# Patient Record
Sex: MALE | Race: WHITE | NOT HISPANIC OR LATINO
[De-identification: names, ages, dates, MRNs, and addresses within clinical notes are randomized per-mention and may not be internally consistent; named-entity substitution may affect disease eponyms.]

---

## 2017-02-02 ENCOUNTER — APPOINTMENT (OUTPATIENT)
Dept: GASTROENTEROLOGY | Facility: CLINIC | Age: 62
End: 2017-02-02

## 2017-03-01 ENCOUNTER — APPOINTMENT (OUTPATIENT)
Dept: NUCLEAR MEDICINE | Facility: HOSPITAL | Age: 62
End: 2017-03-01

## 2017-03-01 ENCOUNTER — OUTPATIENT (OUTPATIENT)
Dept: OUTPATIENT SERVICES | Facility: HOSPITAL | Age: 62
LOS: 1 days | Discharge: HOME | End: 2017-03-01
Payer: COMMERCIAL

## 2017-03-01 DIAGNOSIS — Z90.49 ACQUIRED ABSENCE OF OTHER SPECIFIED PARTS OF DIGESTIVE TRACT: Chronic | ICD-10-CM

## 2017-03-01 DIAGNOSIS — K29.70 GASTRITIS, UNSPECIFIED, WITHOUT BLEEDING: ICD-10-CM

## 2017-03-01 PROCEDURE — 78264 GASTRIC EMPTYING IMG STUDY: CPT | Mod: 26

## 2017-03-07 ENCOUNTER — TRANSCRIPTION ENCOUNTER (OUTPATIENT)
Age: 62
End: 2017-03-07

## 2017-03-13 ENCOUNTER — APPOINTMENT (OUTPATIENT)
Dept: GASTROENTEROLOGY | Facility: CLINIC | Age: 62
End: 2017-03-13

## 2017-04-03 ENCOUNTER — RX RENEWAL (OUTPATIENT)
Age: 62
End: 2017-04-03

## 2017-04-11 ENCOUNTER — APPOINTMENT (OUTPATIENT)
Dept: GASTROENTEROLOGY | Facility: CLINIC | Age: 62
End: 2017-04-11

## 2017-05-04 ENCOUNTER — APPOINTMENT (OUTPATIENT)
Dept: RADIOLOGY | Facility: CLINIC | Age: 62
End: 2017-05-04

## 2017-05-04 ENCOUNTER — OUTPATIENT (OUTPATIENT)
Dept: OUTPATIENT SERVICES | Facility: HOSPITAL | Age: 62
LOS: 1 days | End: 2017-05-04
Payer: COMMERCIAL

## 2017-05-04 DIAGNOSIS — Z90.49 ACQUIRED ABSENCE OF OTHER SPECIFIED PARTS OF DIGESTIVE TRACT: Chronic | ICD-10-CM

## 2017-05-04 DIAGNOSIS — Z00.8 ENCOUNTER FOR OTHER GENERAL EXAMINATION: ICD-10-CM

## 2017-05-04 PROCEDURE — 72070 X-RAY EXAM THORAC SPINE 2VWS: CPT

## 2017-05-04 PROCEDURE — 72202 X-RAY EXAM SI JOINTS 3/> VWS: CPT

## 2017-05-04 PROCEDURE — 72110 X-RAY EXAM L-2 SPINE 4/>VWS: CPT

## 2017-06-20 ENCOUNTER — APPOINTMENT (OUTPATIENT)
Dept: GASTROENTEROLOGY | Facility: CLINIC | Age: 62
End: 2017-06-20

## 2017-08-30 ENCOUNTER — INPATIENT (INPATIENT)
Facility: HOSPITAL | Age: 62
LOS: 1 days | Discharge: HOME | DRG: 93 | End: 2017-09-01
Attending: FAMILY MEDICINE | Admitting: HOSPITALIST
Payer: COMMERCIAL

## 2017-08-30 VITALS
SYSTOLIC BLOOD PRESSURE: 166 MMHG | HEART RATE: 103 BPM | HEIGHT: 71 IN | WEIGHT: 174.17 LBS | TEMPERATURE: 98 F | OXYGEN SATURATION: 99 % | RESPIRATION RATE: 19 BRPM | DIASTOLIC BLOOD PRESSURE: 69 MMHG

## 2017-08-30 DIAGNOSIS — R07.9 CHEST PAIN, UNSPECIFIED: ICD-10-CM

## 2017-08-30 DIAGNOSIS — Z90.49 ACQUIRED ABSENCE OF OTHER SPECIFIED PARTS OF DIGESTIVE TRACT: Chronic | ICD-10-CM

## 2017-08-30 DIAGNOSIS — E78.00 PURE HYPERCHOLESTEROLEMIA, UNSPECIFIED: ICD-10-CM

## 2017-08-30 DIAGNOSIS — K31.84 GASTROPARESIS: ICD-10-CM

## 2017-08-30 DIAGNOSIS — Z29.9 ENCOUNTER FOR PROPHYLACTIC MEASURES, UNSPECIFIED: ICD-10-CM

## 2017-08-30 DIAGNOSIS — E11.9 TYPE 2 DIABETES MELLITUS WITHOUT COMPLICATIONS: ICD-10-CM

## 2017-08-30 DIAGNOSIS — N40.0 BENIGN PROSTATIC HYPERPLASIA WITHOUT LOWER URINARY TRACT SYMPTOMS: ICD-10-CM

## 2017-08-30 DIAGNOSIS — R79.89 OTHER SPECIFIED ABNORMAL FINDINGS OF BLOOD CHEMISTRY: ICD-10-CM

## 2017-08-30 LAB
ALBUMIN SERPL ELPH-MCNC: 3.9 G/DL — SIGNIFICANT CHANGE UP (ref 3.3–5)
ALP SERPL-CCNC: 83 U/L — SIGNIFICANT CHANGE UP (ref 40–120)
ALT FLD-CCNC: 31 U/L — SIGNIFICANT CHANGE UP (ref 12–78)
ANION GAP SERPL CALC-SCNC: 7 MMOL/L — SIGNIFICANT CHANGE UP (ref 5–17)
APTT BLD: 29.2 SEC — SIGNIFICANT CHANGE UP (ref 27.5–37.4)
AST SERPL-CCNC: 17 U/L — SIGNIFICANT CHANGE UP (ref 15–37)
BASOPHILS # BLD AUTO: 0 K/UL — SIGNIFICANT CHANGE UP (ref 0–0.2)
BASOPHILS NFR BLD AUTO: 0.6 % — SIGNIFICANT CHANGE UP (ref 0–2)
BILIRUB SERPL-MCNC: 0.4 MG/DL — SIGNIFICANT CHANGE UP (ref 0.2–1.2)
BUN SERPL-MCNC: 21 MG/DL — SIGNIFICANT CHANGE UP (ref 7–23)
CALCIUM SERPL-MCNC: 8.9 MG/DL — SIGNIFICANT CHANGE UP (ref 8.5–10.1)
CHLORIDE SERPL-SCNC: 104 MMOL/L — SIGNIFICANT CHANGE UP (ref 96–108)
CK MB BLD-MCNC: 1.9 % — SIGNIFICANT CHANGE UP (ref 0–3.5)
CK MB CFR SERPL CALC: 2.4 NG/ML — SIGNIFICANT CHANGE UP (ref 0–3.6)
CK SERPL-CCNC: 128 U/L — SIGNIFICANT CHANGE UP (ref 26–308)
CO2 SERPL-SCNC: 28 MMOL/L — SIGNIFICANT CHANGE UP (ref 22–31)
CREAT SERPL-MCNC: 0.97 MG/DL — SIGNIFICANT CHANGE UP (ref 0.5–1.3)
EOSINOPHIL # BLD AUTO: 0.2 K/UL — SIGNIFICANT CHANGE UP (ref 0–0.5)
EOSINOPHIL NFR BLD AUTO: 2.9 % — SIGNIFICANT CHANGE UP (ref 0–6)
GLUCOSE SERPL-MCNC: 299 MG/DL — HIGH (ref 70–99)
HCT VFR BLD CALC: 39.9 % — SIGNIFICANT CHANGE UP (ref 39–50)
HGB BLD-MCNC: 12.5 G/DL — LOW (ref 13–17)
INR BLD: 0.95 RATIO — SIGNIFICANT CHANGE UP (ref 0.88–1.16)
LYMPHOCYTES # BLD AUTO: 1.6 K/UL — SIGNIFICANT CHANGE UP (ref 1–3.3)
LYMPHOCYTES # BLD AUTO: 21.4 % — SIGNIFICANT CHANGE UP (ref 13–44)
MCHC RBC-ENTMCNC: 29.1 PG — SIGNIFICANT CHANGE UP (ref 27–34)
MCHC RBC-ENTMCNC: 31.4 GM/DL — LOW (ref 32–36)
MCV RBC AUTO: 92.7 FL — SIGNIFICANT CHANGE UP (ref 80–100)
MONOCYTES # BLD AUTO: 0.5 K/UL — SIGNIFICANT CHANGE UP (ref 0–0.9)
MONOCYTES NFR BLD AUTO: 7.5 % — SIGNIFICANT CHANGE UP (ref 1–9)
NEUTROPHILS # BLD AUTO: 5 K/UL — SIGNIFICANT CHANGE UP (ref 1.8–7.4)
NEUTROPHILS NFR BLD AUTO: 67.7 % — SIGNIFICANT CHANGE UP (ref 43–77)
NT-PROBNP SERPL-SCNC: 88 PG/ML — SIGNIFICANT CHANGE UP (ref 0–125)
PLATELET # BLD AUTO: 218 K/UL — SIGNIFICANT CHANGE UP (ref 150–400)
POTASSIUM SERPL-MCNC: 4.7 MMOL/L — SIGNIFICANT CHANGE UP (ref 3.5–5.3)
POTASSIUM SERPL-SCNC: 4.7 MMOL/L — SIGNIFICANT CHANGE UP (ref 3.5–5.3)
PROT SERPL-MCNC: 7.4 G/DL — SIGNIFICANT CHANGE UP (ref 6–8.3)
PROTHROM AB SERPL-ACNC: 10.3 SEC — SIGNIFICANT CHANGE UP (ref 9.8–12.7)
RBC # BLD: 4.3 M/UL — SIGNIFICANT CHANGE UP (ref 4.2–5.8)
RBC # FLD: 13.9 % — SIGNIFICANT CHANGE UP (ref 10.3–14.5)
SODIUM SERPL-SCNC: 139 MMOL/L — SIGNIFICANT CHANGE UP (ref 135–145)
TROPONIN I SERPL-MCNC: <.015 NG/ML — SIGNIFICANT CHANGE UP (ref 0.01–0.04)
WBC # BLD: 7.3 K/UL — SIGNIFICANT CHANGE UP (ref 3.8–10.5)
WBC # FLD AUTO: 7.3 K/UL — SIGNIFICANT CHANGE UP (ref 3.8–10.5)

## 2017-08-30 PROCEDURE — 71275 CT ANGIOGRAPHY CHEST: CPT | Mod: 26

## 2017-08-30 PROCEDURE — 70450 CT HEAD/BRAIN W/O DYE: CPT | Mod: 26

## 2017-08-30 PROCEDURE — 99285 EMERGENCY DEPT VISIT HI MDM: CPT

## 2017-08-30 PROCEDURE — 93010 ELECTROCARDIOGRAM REPORT: CPT

## 2017-08-30 PROCEDURE — 99223 1ST HOSP IP/OBS HIGH 75: CPT | Mod: AI,GC

## 2017-08-30 PROCEDURE — 70551 MRI BRAIN STEM W/O DYE: CPT | Mod: 26

## 2017-08-30 PROCEDURE — 71010: CPT | Mod: 26

## 2017-08-30 RX ORDER — DEXTROSE 50 % IN WATER 50 %
25 SYRINGE (ML) INTRAVENOUS ONCE
Qty: 0 | Refills: 0 | Status: DISCONTINUED | OUTPATIENT
Start: 2017-08-30 | End: 2017-09-01

## 2017-08-30 RX ORDER — SODIUM CHLORIDE 9 MG/ML
1000 INJECTION, SOLUTION INTRAVENOUS
Qty: 0 | Refills: 0 | Status: DISCONTINUED | OUTPATIENT
Start: 2017-08-30 | End: 2017-08-30

## 2017-08-30 RX ORDER — DEXTROSE 50 % IN WATER 50 %
12.5 SYRINGE (ML) INTRAVENOUS ONCE
Qty: 0 | Refills: 0 | Status: DISCONTINUED | OUTPATIENT
Start: 2017-08-30 | End: 2017-09-01

## 2017-08-30 RX ORDER — PANTOPRAZOLE SODIUM 20 MG/1
40 TABLET, DELAYED RELEASE ORAL
Qty: 0 | Refills: 0 | Status: DISCONTINUED | OUTPATIENT
Start: 2017-08-30 | End: 2017-08-30

## 2017-08-30 RX ORDER — INSULIN GLARGINE 100 [IU]/ML
36 INJECTION, SOLUTION SUBCUTANEOUS EVERY MORNING
Qty: 0 | Refills: 0 | Status: DISCONTINUED | OUTPATIENT
Start: 2017-08-31 | End: 2017-09-01

## 2017-08-30 RX ORDER — INSULIN LISPRO 100/ML
VIAL (ML) SUBCUTANEOUS
Qty: 0 | Refills: 0 | Status: DISCONTINUED | OUTPATIENT
Start: 2017-08-30 | End: 2017-09-01

## 2017-08-30 RX ORDER — DEXTROSE 50 % IN WATER 50 %
25 SYRINGE (ML) INTRAVENOUS ONCE
Qty: 0 | Refills: 0 | Status: DISCONTINUED | OUTPATIENT
Start: 2017-08-30 | End: 2017-08-30

## 2017-08-30 RX ORDER — SIMVASTATIN 20 MG/1
10 TABLET, FILM COATED ORAL AT BEDTIME
Qty: 0 | Refills: 0 | Status: DISCONTINUED | OUTPATIENT
Start: 2017-08-30 | End: 2017-08-31

## 2017-08-30 RX ORDER — ASPIRIN/CALCIUM CARB/MAGNESIUM 324 MG
81 TABLET ORAL DAILY
Qty: 0 | Refills: 0 | Status: DISCONTINUED | OUTPATIENT
Start: 2017-08-30 | End: 2017-08-31

## 2017-08-30 RX ORDER — OMEPRAZOLE 10 MG/1
1 CAPSULE, DELAYED RELEASE ORAL
Qty: 0 | Refills: 0 | COMMUNITY

## 2017-08-30 RX ORDER — GLUCAGON INJECTION, SOLUTION 0.5 MG/.1ML
1 INJECTION, SOLUTION SUBCUTANEOUS ONCE
Qty: 0 | Refills: 0 | Status: DISCONTINUED | OUTPATIENT
Start: 2017-08-30 | End: 2017-08-30

## 2017-08-30 RX ORDER — ASPIRIN/CALCIUM CARB/MAGNESIUM 324 MG
81 TABLET ORAL DAILY
Qty: 0 | Refills: 0 | Status: DISCONTINUED | OUTPATIENT
Start: 2017-08-30 | End: 2017-08-30

## 2017-08-30 RX ORDER — TAMSULOSIN HYDROCHLORIDE 0.4 MG/1
0.4 CAPSULE ORAL AT BEDTIME
Qty: 0 | Refills: 0 | Status: DISCONTINUED | OUTPATIENT
Start: 2017-08-30 | End: 2017-09-01

## 2017-08-30 RX ORDER — DEXTROSE 50 % IN WATER 50 %
1 SYRINGE (ML) INTRAVENOUS ONCE
Qty: 0 | Refills: 0 | Status: DISCONTINUED | OUTPATIENT
Start: 2017-08-30 | End: 2017-09-01

## 2017-08-30 RX ORDER — DEXTROSE 50 % IN WATER 50 %
1 SYRINGE (ML) INTRAVENOUS ONCE
Qty: 0 | Refills: 0 | Status: DISCONTINUED | OUTPATIENT
Start: 2017-08-30 | End: 2017-08-30

## 2017-08-30 RX ORDER — GLUCAGON INJECTION, SOLUTION 0.5 MG/.1ML
1 INJECTION, SOLUTION SUBCUTANEOUS ONCE
Qty: 0 | Refills: 0 | Status: DISCONTINUED | OUTPATIENT
Start: 2017-08-30 | End: 2017-09-01

## 2017-08-30 RX ORDER — PANTOPRAZOLE SODIUM 20 MG/1
40 TABLET, DELAYED RELEASE ORAL
Qty: 0 | Refills: 0 | Status: DISCONTINUED | OUTPATIENT
Start: 2017-08-30 | End: 2017-09-01

## 2017-08-30 RX ORDER — INSULIN LISPRO 100/ML
VIAL (ML) SUBCUTANEOUS AT BEDTIME
Qty: 0 | Refills: 0 | Status: DISCONTINUED | OUTPATIENT
Start: 2017-08-30 | End: 2017-09-01

## 2017-08-30 RX ORDER — SODIUM CHLORIDE 9 MG/ML
1000 INJECTION, SOLUTION INTRAVENOUS
Qty: 0 | Refills: 0 | Status: DISCONTINUED | OUTPATIENT
Start: 2017-08-30 | End: 2017-09-01

## 2017-08-30 RX ORDER — DEXTROSE 50 % IN WATER 50 %
12.5 SYRINGE (ML) INTRAVENOUS ONCE
Qty: 0 | Refills: 0 | Status: DISCONTINUED | OUTPATIENT
Start: 2017-08-30 | End: 2017-08-30

## 2017-08-30 RX ORDER — INSULIN GLARGINE 100 [IU]/ML
36 INJECTION, SOLUTION SUBCUTANEOUS EVERY MORNING
Qty: 0 | Refills: 0 | Status: DISCONTINUED | OUTPATIENT
Start: 2017-08-30 | End: 2017-08-30

## 2017-08-30 RX ORDER — INSULIN LISPRO 100/ML
VIAL (ML) SUBCUTANEOUS
Qty: 0 | Refills: 0 | Status: DISCONTINUED | OUTPATIENT
Start: 2017-08-30 | End: 2017-08-30

## 2017-08-30 RX ORDER — ENOXAPARIN SODIUM 100 MG/ML
40 INJECTION SUBCUTANEOUS EVERY 24 HOURS
Qty: 0 | Refills: 0 | Status: DISCONTINUED | OUTPATIENT
Start: 2017-08-30 | End: 2017-09-01

## 2017-08-30 RX ADMIN — PANTOPRAZOLE SODIUM 40 MILLIGRAM(S): 20 TABLET, DELAYED RELEASE ORAL at 22:49

## 2017-08-30 RX ADMIN — TAMSULOSIN HYDROCHLORIDE 0.4 MILLIGRAM(S): 0.4 CAPSULE ORAL at 22:38

## 2017-08-30 RX ADMIN — ENOXAPARIN SODIUM 40 MILLIGRAM(S): 100 INJECTION SUBCUTANEOUS at 22:37

## 2017-08-30 NOTE — H&P ADULT - PROBLEM SELECTOR PLAN 2
- Cardio (Dr. Cook) consulted, recommendations are appreciated  - CE x 1 negative  - Continue ASA ??mg  - f/u CE trend - EKG NSR at 68bpm  - CE x 1 negative, f/u CE trend q8h  - Cardio (Dr. Cook) consulted, recommendations are appreciated  - Continue ASA 81mg daily

## 2017-08-30 NOTE — H&P ADULT - PROBLEM SELECTOR PLAN 5
- Stable  - continue simvastatin - continue lanus 36u qAM  - moderate ISS  - accuchecks  - consistent carb diet  - f/u Hgb A1c

## 2017-08-30 NOTE — H&P ADULT - ASSESSMENT
63 y/o M with PMH of DM1, hypercholesterolemia, and gastroparesis, presents to the ED with L arm and L ankle/foot numbness/tingling x1 day, admitted for r/o TIA and r/o CVA. 63 y/o M with PMH of DM1, hypercholesterolemia, and gastroparesis, presents to the ED with L arm and L ankle/foot numbness/tingling x1 day, admitted for r/o TIA and r/o ACS.

## 2017-08-30 NOTE — H&P ADULT - PMH
BPH (benign prostatic hyperplasia)  not on BPH meds  Diabetes    Gastroparesis    Hypercholesteremia

## 2017-08-30 NOTE — H&P ADULT - HISTORY OF PRESENT ILLNESS
63 y/o M with PMH of DM1, hypercholesterolemia, and gastroparesis, presents to the ED with L arm and L ankle/foot numbness/tingling x1 day. He states that yesterday around 10pm he began to have numbness/tingling throughout his L arm, he also notes having tightness in the L hand at the time. He states when it began he started to feel very anxious like he couldn't sit still. This episode lasted 1 hour then dissipated. Today he had another episode around 3pm but in addition to his L arm, he felt numbness/tingling in his L foot/ankle, and slight lightheadedness, which lasted 2 hours and resolved while he was in the ED. He denies any previous occurence or any injury/trauma to his arms/legs. During these episodes he denies gait difficulty, weakness, vision changes, slurred speech, dysarthria, or N/V.    In the ED pt VS were 98.3F, , /69, RR19, SpO2 99% on RA. Hgb 12.5, glucose 299, CE's neg x1, BNP WNL, D-dimer 264. EKG NSR at 68bpm. Head CT neg for acute intracranial hemorrhage or other intracranial pathology. MRI head negative for acute infarct or other pathology (prelim reading). CTA chest negative for PE. CXR negative for active disease (prelim reading). 63 y/o M with PMH of DM1, hypercholesterolemia, and gastroparesis, presents to the ED with L arm and L ankle/foot numbness/tingling x1 day. He states that yesterday around 10pm he began to have numbness/tingling throughout his L arm, he also notes having tightness in the L hand at the time. He states when it began he started to feel very anxious like he couldn't sit still. This episode lasted 1 hour then dissipated. Today he had another episode around 3pm but in addition to his L arm, he felt numbness/tingling in his L foot/ankle, and slight lightheadedness, which lasted 2 hours and resolved while he was in the ED. He denies any previous occurence or any injury/trauma to his arms/legs. During these episodes he denies gait difficulty, weakness, vision changes, slurred speech, dysarthria, or N/V. Pt also requests being tested for lyme disease, pt experiencing morning stiffness and has a family member who lives in the same neighborhood as him who was recently diagnosed with lyme; pt denies having known tick bite.    In the ED pt VS were significant for , /69. Lab significant for glucose 299, CE's neg x1, BNP WNL, D-dimer 264. EKG NSR at 68bpm. Head CT neg for acute intracranial hemorrhage or other intracranial pathology. MRI head negative for acute infarct or other pathology (prelim reading). CTA chest negative for PE. CXR negative for active disease (prelim reading).

## 2017-08-30 NOTE — H&P ADULT - NSHPSOCIALHISTORY_GEN_ALL_CORE
Lives in house with wife  Retired from [employer]  Smoking: former smoker, quit 38 years ago, smoked 1ppd x10 yrs  EtOH: denies  Drugs: denies  Colonoscopy: 1.5 years ago, normal  Flu vacc: fall 2016  Pneumo vacc: feb 2016  TdaP: pt unsure  HBV vacc: pt unsure

## 2017-08-30 NOTE — H&P ADULT - PROBLEM SELECTOR PLAN 8
- DVT proph with LVX  - GI proph not indicated - chronic  - pt not on any medication - chronic  - continue flomax

## 2017-08-30 NOTE — H&P ADULT - PROBLEM SELECTOR PLAN 4
- ISS  - accuchecks  - consistent carb diet  - f/u Hgb A1c - Pt c/o AM stiffness and has h/o family member with recent lyme, pt requested lyme titers  - F/u lyme titers

## 2017-08-30 NOTE — ED ADULT NURSE NOTE - OBJECTIVE STATEMENT
63 yo male received ambulatory, alert and oriented x 4, c/o numbness. Pt stated numbness and tingling to left arm, tightness in left hand x 1 day, symptoms passed last night, lasting 1 hour. Numbness and tingling returned and worsen this afternoon. Pt c/o anxiety, lightheadedness, dizziness, and numbness radiating to left ankle. Pt denies any numbness/ pain in neck, chest or back. Pt denies any fever, chills, sob, chest pain. No acute s/s of distress noted.

## 2017-08-30 NOTE — H&P ADULT - FAMILY HISTORY
Father  Still living? No  Family history of stroke, Age at diagnosis: Age Unknown  Family history of lung cancer, Age at diagnosis: Age Unknown     Mother  Still living? No  Family history of Alzheimer's disease, Age at diagnosis: Age Unknown

## 2017-08-30 NOTE — H&P ADULT - NSHPPHYSICALEXAM_GEN_ALL_CORE
Physical Exam:  General: Well developed, well nourished, NAD  HEENT: NCAT, PERRLA, EOMI bl, moist mucous membranes   Neck: Supple, nontender  Neurology: A&Ox3, nonfocal, CN II-XII grossly intact, SILT b/l LE and UE, pt able to move all 4 extremities and reposition self in bed, muscle strenth 5/5 b/l UE and LE, repetition intact, 3/3 on 3 word recall  Respiratory: CTA B/L, No W/R/R  CV: RRR, +S1/S2, no murmurs, rubs or gallops  Abdominal: Soft, NT, ND +BSx4  Extremities: No C/C/E, + peripheral pulses, no calf tenderness b/l  MSK: Normal ROM, no joint erythema or warmth, no joint swelling   Skin: warm, dry, normal color, no rash or abnormal lesions

## 2017-08-30 NOTE — H&P ADULT - ATTENDING COMMENTS
seen and examined by attending MD, patient woth wife at bedside voice understanding and agreement to aforementioned.

## 2017-08-30 NOTE — ED PROVIDER NOTE - OBJECTIVE STATEMENT
pt a 61 y/o man with a hx of dm, hld presents with numbness/tingling in the left arm and leg. onset was at 10 pm last night. no dysarthria. no weakness. no visual changes. no ataxia. no dizziness. no syncope. denies chest pain however patient reports feeling very "anxious". no dyspnea. no leg pain. no leg swelling. no diaphoresis. no headache. no back pain. no abdominal pain. no other complaints. pt took aspirin today.

## 2017-08-30 NOTE — H&P ADULT - NSHPREVIEWOFSYSTEMS_GEN_ALL_CORE
Constitutional: denies fever, chills  HEENT: denies blurry vision, difficulty hearing  Respiratory: denies SOB, MARTIN, cough, sputum production  Cardiovascular: denies CP, palpitations  Gastrointestinal: denies nausea, vomiting, diarrhea, constipation, abdominal pain, melena, hematochezia   Genitourinary: denies dysuria, frequency, hematuria; urgency (chronic 2/2 BPH)  Skin: denies rash, itching  Musculoskeletal: denies myalgias, joint swelling, muscle weakness  Neurologic: denies headache, weakness, dizziness, paresthesias, numbness/tingling  Psychiatric: denies feeling anxious, depressed  Hematology/Oncology: denies bruising  ROS negative except as noted above Constitutional: denies fever, chills  HEENT: denies blurry vision, difficulty hearing  Respiratory: denies SOB, MARTIN, cough, sputum production  Cardiovascular: denies CP, palpitations  Gastrointestinal: denies nausea, vomiting, diarrhea, constipation, abdominal pain, melena, hematochezia   Genitourinary: denies dysuria, frequency, hematuria; urgency (chronic 2/2 BPH)  Skin: denies rash, itching  Musculoskeletal: denies myalgias, joint swelling, muscle weakness; Positive: morning stiffness  Neurologic: denies headache, weakness, dizziness, paresthesias, numbness/tingling  Psychiatric: denies feeling anxious, depressed  Hematology/Oncology: denies bruising  ROS negative except as noted above

## 2017-08-30 NOTE — ED PROVIDER NOTE - NEUROLOGICAL, MLM
Alert and oriented, no focal deficits, no motor or sensory deficits. normal cerebellar functioning. normal gait. cn ii to xii intact.

## 2017-08-30 NOTE — ED PROVIDER NOTE - CHPI ED SYMPTOMS NEG
no nausea/no fever/no loss of consciousness/no confusion/no vomiting/no dizziness/no change in level of consciousness/no blurred vision/no weakness

## 2017-08-30 NOTE — H&P ADULT - NSHPOUTPATIENTPROVIDERS_GEN_ALL_CORE
PCP: Dr. Minh Miner, Endocrinologist: Dr. García, Gastroenterologist: Dr. Garcia, Urologist: Dr. Son

## 2017-08-30 NOTE — H&P ADULT - PROBLEM SELECTOR PLAN 1
- admit to tele for observation/monitoring, pt without any symptoms/deficits at this time  - Head CT neg for acute intracranial hemorrhage or other intracranial pathology.   - MRI head negative for acute infarct or other pathology (prelim reading).  - Neurology (Dr. Rose) consulted, recommendations are appreciated  - f/u AM labs - admit to tele for observation/monitoring, pt without any symptoms/deficits at this time  - Head CT neg for acute intracranial hemorrhage or other intracranial pathology.   - MRI head negative for acute infarct or other pathology (prelim reading).  - Neurology (Dr. Rose) consulted, recommendations are appreciated  - neurochecks q6h  - f/u AM labs - admit to tele for observation/monitoring, pt without any symptoms/deficits at this time  - Head CT neg for acute intracranial hemorrhage or other intracranial pathology.   - MRI head negative for acute infarct or other pathology (prelim reading).  - Neurology (Dr. Rose) consulted, recommendations are appreciated  - neurochecks q6h  - f/u echo  - f/u carotid doppler  - f/u TSH  - f/u cholesterol panel  - f/u AM labs

## 2017-08-30 NOTE — ED PROVIDER NOTE - MEDICAL DECISION MAKING DETAILS
pt admitted for r/o acs, r/o cva pt admitted for possible cva vs tia and for r/o acs  nihss score 0  er workup: labs, ekg, ct head, mri head, neuro consult, cardio consult, admit  admitted for carotid doppler us, echo and further specialist eval

## 2017-08-30 NOTE — H&P ADULT - PROBLEM SELECTOR PLAN 7
- chronic  - pt not on any medication - chronic, 2/2 DM, pt states he has delayed transit time from stomach and was put on omeprazole by his gastroenterologist  - continue home omeprazole

## 2017-08-31 DIAGNOSIS — G45.9 TRANSIENT CEREBRAL ISCHEMIC ATTACK, UNSPECIFIED: ICD-10-CM

## 2017-08-31 DIAGNOSIS — E10.9 TYPE 1 DIABETES MELLITUS WITHOUT COMPLICATIONS: ICD-10-CM

## 2017-08-31 LAB
ANION GAP SERPL CALC-SCNC: 7 MMOL/L — SIGNIFICANT CHANGE UP (ref 5–17)
B BURGDOR C6 AB SER-ACNC: NEGATIVE — SIGNIFICANT CHANGE UP
B BURGDOR IGG+IGM SER-ACNC: 0.02 INDEX — SIGNIFICANT CHANGE UP (ref 0.01–0.89)
BUN SERPL-MCNC: 16 MG/DL — SIGNIFICANT CHANGE UP (ref 7–23)
CALCIUM SERPL-MCNC: 8.5 MG/DL — SIGNIFICANT CHANGE UP (ref 8.5–10.1)
CHLORIDE SERPL-SCNC: 106 MMOL/L — SIGNIFICANT CHANGE UP (ref 96–108)
CHOLEST SERPL-MCNC: 122 MG/DL — SIGNIFICANT CHANGE UP (ref 10–199)
CK MB BLD-MCNC: 1.5 % — SIGNIFICANT CHANGE UP (ref 0–3.5)
CK MB BLD-MCNC: 1.7 % — SIGNIFICANT CHANGE UP (ref 0–3.5)
CK MB CFR SERPL CALC: 1.5 NG/ML — SIGNIFICANT CHANGE UP (ref 0–3.6)
CK MB CFR SERPL CALC: 1.6 NG/ML — SIGNIFICANT CHANGE UP (ref 0–3.6)
CK SERPL-CCNC: 107 U/L — SIGNIFICANT CHANGE UP (ref 26–308)
CK SERPL-CCNC: 88 U/L — SIGNIFICANT CHANGE UP (ref 26–308)
CO2 SERPL-SCNC: 27 MMOL/L — SIGNIFICANT CHANGE UP (ref 22–31)
CREAT SERPL-MCNC: 0.89 MG/DL — SIGNIFICANT CHANGE UP (ref 0.5–1.3)
GLUCOSE SERPL-MCNC: 257 MG/DL — HIGH (ref 70–99)
HCT VFR BLD CALC: 36.1 % — LOW (ref 39–50)
HDLC SERPL-MCNC: 60 MG/DL — SIGNIFICANT CHANGE UP (ref 40–125)
HGB BLD-MCNC: 11.7 G/DL — LOW (ref 13–17)
LDLC SERPL DIRECT ASSAY-MCNC: 59 MG/DL — SIGNIFICANT CHANGE UP
MCHC RBC-ENTMCNC: 29.8 PG — SIGNIFICANT CHANGE UP (ref 27–34)
MCHC RBC-ENTMCNC: 32.4 GM/DL — SIGNIFICANT CHANGE UP (ref 32–36)
MCV RBC AUTO: 92.1 FL — SIGNIFICANT CHANGE UP (ref 80–100)
PLATELET # BLD AUTO: 183 K/UL — SIGNIFICANT CHANGE UP (ref 150–400)
POTASSIUM SERPL-MCNC: 4.4 MMOL/L — SIGNIFICANT CHANGE UP (ref 3.5–5.3)
POTASSIUM SERPL-SCNC: 4.4 MMOL/L — SIGNIFICANT CHANGE UP (ref 3.5–5.3)
RBC # BLD: 3.92 M/UL — LOW (ref 4.2–5.8)
RBC # FLD: 13.6 % — SIGNIFICANT CHANGE UP (ref 10.3–14.5)
SODIUM SERPL-SCNC: 140 MMOL/L — SIGNIFICANT CHANGE UP (ref 135–145)
TRIGL SERPL-MCNC: 39 MG/DL — SIGNIFICANT CHANGE UP (ref 10–149)
TROPONIN I SERPL-MCNC: <.015 NG/ML — SIGNIFICANT CHANGE UP (ref 0.01–0.04)
TROPONIN I SERPL-MCNC: <.015 NG/ML — SIGNIFICANT CHANGE UP (ref 0.01–0.04)
TSH SERPL-MCNC: 2.88 UIU/ML — SIGNIFICANT CHANGE UP (ref 0.36–3.74)
WBC # BLD: 7.8 K/UL — SIGNIFICANT CHANGE UP (ref 3.8–10.5)
WBC # FLD AUTO: 7.8 K/UL — SIGNIFICANT CHANGE UP (ref 3.8–10.5)

## 2017-08-31 PROCEDURE — 93931 UPPER EXTREMITY STUDY: CPT | Mod: 26,LT

## 2017-08-31 PROCEDURE — 99233 SBSQ HOSP IP/OBS HIGH 50: CPT | Mod: GC

## 2017-08-31 RX ORDER — ATORVASTATIN CALCIUM 80 MG/1
40 TABLET, FILM COATED ORAL AT BEDTIME
Qty: 0 | Refills: 0 | Status: DISCONTINUED | OUTPATIENT
Start: 2017-08-31 | End: 2017-09-01

## 2017-08-31 RX ORDER — CLOPIDOGREL BISULFATE 75 MG/1
75 TABLET, FILM COATED ORAL DAILY
Qty: 0 | Refills: 0 | Status: DISCONTINUED | OUTPATIENT
Start: 2017-08-31 | End: 2017-09-01

## 2017-08-31 RX ORDER — ALPRAZOLAM 0.25 MG
0.25 TABLET ORAL ONCE
Qty: 0 | Refills: 0 | Status: DISCONTINUED | OUTPATIENT
Start: 2017-08-31 | End: 2017-08-31

## 2017-08-31 RX ADMIN — TAMSULOSIN HYDROCHLORIDE 0.4 MILLIGRAM(S): 0.4 CAPSULE ORAL at 21:48

## 2017-08-31 RX ADMIN — INSULIN GLARGINE 36 UNIT(S): 100 INJECTION, SOLUTION SUBCUTANEOUS at 07:44

## 2017-08-31 RX ADMIN — Medication 81 MILLIGRAM(S): at 11:46

## 2017-08-31 RX ADMIN — Medication 4: at 07:39

## 2017-08-31 RX ADMIN — CLOPIDOGREL BISULFATE 75 MILLIGRAM(S): 75 TABLET, FILM COATED ORAL at 17:19

## 2017-08-31 RX ADMIN — PANTOPRAZOLE SODIUM 40 MILLIGRAM(S): 20 TABLET, DELAYED RELEASE ORAL at 05:02

## 2017-08-31 RX ADMIN — ENOXAPARIN SODIUM 40 MILLIGRAM(S): 100 INJECTION SUBCUTANEOUS at 21:48

## 2017-08-31 RX ADMIN — Medication 2: at 11:50

## 2017-08-31 RX ADMIN — PANTOPRAZOLE SODIUM 40 MILLIGRAM(S): 20 TABLET, DELAYED RELEASE ORAL at 17:16

## 2017-08-31 RX ADMIN — Medication 2: at 17:15

## 2017-08-31 NOTE — CONSULT NOTE ADULT - ASSESSMENT
episode of left arm stiffness, numbness one episode of left leg numbness  ? etiology  DDX subclinical sz, ? tia no eeg tech, will d/c aspirin change to Plavix   no with event of left arm ? pale / cold --- patient had a CTA of chest spoke to radiology DR COX subclavian vessels show  no signs of stenosis --- will check bp on both arms  spoke to son at bedside  pt to follow up with outside neurology

## 2017-08-31 NOTE — CONSULT NOTE ADULT - SUBJECTIVE AND OBJECTIVE BOX
REQUESTING PHYSICIAN:    REASON FOR CONSULT: Patient is a 62y old  Male who presents with a chief complaint of Left arm numbness/tingling (30 Aug 2017 21:55)      CHIEF COMPLAINT: Patient is a 62y old  Male who presents with a chief complaint of Left arm numbness/tingling (30 Aug 2017 21:55)      HPI:  63 y/o M with PMH of DM1, hypercholesterolemia, and gastroparesis, presents to the ED with L arm and L ankle/foot numbness/tingling x1 day. He states that yesterday around 10pm he began to have numbness/tingling throughout his L arm, he also notes having tightness in the L hand at the time. He states when it began he started to feel very anxious like he couldn't sit still. This episode lasted 1 hour then dissipated. Today he had another episode around 3pm but in addition to his L arm, he felt numbness/tingling in his L foot/ankle, and slight lightheadedness, which lasted 2 hours and resolved while he was in the ED. He denies any previous occurence or any injury/trauma to his arms/legs. During these episodes he denies gait difficulty, weakness, vision changes, slurred speech, dysarthria, or N/V. Pt also requests being tested for lyme disease, pt experiencing morning stiffness and has a family member who lives in the same neighborhood as him who was recently diagnosed with lyme; pt denies having known tick bite. Patient denies chest pain, SOB, or palpitations. Patient is active and walks a little over a mile daily with no cardiac complaints.     In the ED pt VS were significant for , /69. Lab significant for glucose 299, CE's neg x1, BNP WNL, D-dimer 264. EKG NSR at 68bpm. Head CT neg for acute intracranial hemorrhage or other intracranial pathology. MRI head negative for acute infarct or other pathology (prelim reading). CTA chest negative for PE. CXR negative for active disease (prelim reading). (30 Aug 2017 19:47)    PAST MEDICAL & SURGICAL HISTORY:  BPH (benign prostatic hyperplasia): not on BPH meds  Gastroparesis  Hypercholesteremia  Diabetes  History of appendectomy    SOCIAL HISTORY  No smoking  No EtOH  No illicit drug use      FAMILY HISTORY:   Family history of Alzheimer's disease (Mother): mother,  at 75 y/o 2/2 PNA  Family history of lung cancer (Father): father,  at 85 y/o  Family history of stroke (Father): father @ 81y/o      MEDICATIONS  (STANDING):  simvastatin 10 milliGRAM(s) Oral at bedtime  enoxaparin Injectable 40 milliGRAM(s) SubCutaneous every 24 hours  aspirin enteric coated 81 milliGRAM(s) Oral daily  tamsulosin 0.4 milliGRAM(s) Oral at bedtime  insulin glargine Injectable (LANTUS) 36 Unit(s) SubCutaneous every morning  insulin lispro (HumaLOG) corrective regimen sliding scale   SubCutaneous three times a day before meals  insulin lispro (HumaLOG) corrective regimen sliding scale   SubCutaneous at bedtime  dextrose 5%. 1000 milliLiter(s) (50 mL/Hr) IV Continuous <Continuous>  dextrose 50% Injectable 12.5 Gram(s) IV Push once  dextrose 50% Injectable 25 Gram(s) IV Push once  dextrose 50% Injectable 25 Gram(s) IV Push once  pantoprazole    Tablet 40 milliGRAM(s) Oral two times a day before meals    MEDICATIONS  (PRN):  dextrose Gel 1 Dose(s) Oral once PRN Blood Glucose LESS THAN 70 milliGRAM(s)/deciliter  glucagon  Injectable 1 milliGRAM(s) IntraMuscular once PRN Glucose LESS THAN 70 milligrams/deciliter      Allergies    penicillin (Rash)  sulfa drugs (Rash)    Intolerances      REVIEW OF SYSTEMS:    CONSTITUTIONAL: No weakness, fevers or chills  EYES/ENT: No visual changes;  No vertigo or throat pain   NECK: No pain or stiffness  RESPIRATORY: No cough, wheezing, hemoptysis; No shortness of breath  CARDIOVASCULAR: No chest pain or palpitations  GASTROINTESTINAL: No abdominal pain. No nausea, vomiting, or hematemesis; No diarrhea or constipation. No melena or hematochezia.  GENITOURINARY: No dysuria, frequency or hematuria  NEUROLOGICAL: (+)  numbness left arm/hand, left foot (currently resolved  SKIN: No itching or rash  All other review of systems is negative unless indicated above    VITAL SIGNS:   Vital Signs Last 24 Hrs  T(C): 36.3 (31 Aug 2017 07:16), Max: 36.8 (30 Aug 2017 16:13)  T(F): 97.3 (31 Aug 2017 07:16), Max: 98.3 (30 Aug 2017 16:13)  HR: 64 (31 Aug 2017 07:16) (58 - 103)  BP: 122/70 (31 Aug 2017 07:16) (99/58 - 166/69)  BP(mean): --  RR: 16 (31 Aug 2017 07:16) (15 - 19)  SpO2: 97% (31 Aug 2017 07:16) (96% - 99%)    I&O's Summary    31 Aug 2017 07:01  -  31 Aug 2017 09:32  --------------------------------------------------------  IN: 480 mL / OUT: 0 mL / NET: 480 mL        PHYSICAL EXAM:    Constitutional: NAD, awake and alert, well-developed  Eyes:  EOMI,  Pupils round, No oral cyanosis.  Pulmonary: Non-labored, breath sounds are clear bilaterally, No wheezing, rales or rhonchi  Cardiovascular: S1 and S2, regular rate and rhythm, no Murmurs, gallops or rubs, no carotid bruits appreciated  Gastrointestinal: Bowel Sounds present, soft, nontender.   Lymph: No peripheral edema. No cervical lymphadenopathy.  Neurological: Alert, no focal deficits  Extremities: no lower extremity edema, intact distal pedal pulses  Skin: No rashes.  Psych:  Mood & affect appropriate    LABS: All Labs Reviewed:                        11.7   7.8   )-----------( 183      ( 31 Aug 2017 05:36 )             36.1                         12.5   7.3   )-----------( 218      ( 30 Aug 2017 17:15 )             39.9     31 Aug 2017 05:36    140    |  106    |  16     ----------------------------<  257    4.4     |  27     |  0.89   30 Aug 2017 17:15    139    |  104    |  21     ----------------------------<  299    4.7     |  28     |  0.97     Ca    8.5        31 Aug 2017 05:36  Ca    8.9        30 Aug 2017 17:15    TPro  7.4    /  Alb  3.9    /  TBili  0.4    /  DBili  x      /  AST  17     /  ALT  31     /  AlkPhos  83     30 Aug 2017 17:15    PT/INR - ( 30 Aug 2017 17:15 )   PT: 10.3 sec;   INR: 0.95 ratio         PTT - ( 30 Aug 2017 17:15 )  PTT:29.2 sec  CARDIAC MARKERS ( 31 Aug 2017 08:31 )  <.015 ng/mL / x     / 88 U/L / x     / 1.5 ng/mL  CARDIAC MARKERS ( 31 Aug 2017 00:32 )  <.015 ng/mL / x     / 107 U/L / x     / 1.6 ng/mL  CARDIAC MARKERS ( 30 Aug 2017 17:15 )  <.015 ng/mL / x     / 128 U/L / x     / 2.4 ng/mL      Blood Culture:    @ 17:15  Pro Bnp 88     @ 05:36  TSH: 2.88      EKG: NSR    Imaging: reviewed reports    Telemetry: NSR, no arrhythmias appreciated

## 2017-08-31 NOTE — PROGRESS NOTE ADULT - PROBLEM SELECTOR PLAN 1
Unlikely with negative workup  -CT head negative  -CT angio negative for PE  -MRI negative  -EKG NSR at 68bpm  -CE negative  x3   -Cardio (Dr. Cook) following, changed statin to Atorvastatin 40mg daily  -Continue ASA 81mg daily

## 2017-08-31 NOTE — PROGRESS NOTE ADULT - ATTENDING COMMENTS
Agree with plan and exam as above with following: In brief 63 y/o M with PMH of DM1, hypercholesterolemia, and gastroparesis, presents to the ED with L arm and L ankle/foot numbness/tingling x1 day. Patient felt well overnight but this morning again had numbness and cold left arm. He was not able to tolerate carotid ultrasound. Discussed case with Dr. Corona given symptoms concerning for subclavian stenosis. Dr. Corona reviewed CT with radiology and determined no stenosis of subclavian arteries. Patient evaluated by Cardiology. ECG NSR. Switched to high dose statin given increase ASCVD score. Pending ECHO. Given troponin negative x3 ECG NSR unlikely underlying cardiac ischemia. Will continue telemetry monitor. Monitor for TIA although imaging MRI thus far normal. Appreciate Neuro and Cardiology recs. Continue rest as above. Agree with plan and exam as above with following: In brief 63 y/o M with PMH of DM1, hypercholesterolemia, and gastroparesis, presents to the ED with L arm and L ankle/foot numbness/tingling x1 day. Patient felt well overnight but this morning again had numbness and cold left arm. He was not able to tolerate carotid ultrasound. Discussed case with Dr. Corona given symptoms concerning for subclavian stenosis. Dr. Corona reviewed CT with radiology and determined no stenosis of subclavian arteries. Patient evaluated by Cardiology. ECG NSR. Switched to high dose statin given increase ASCVD score. Pending ECHO. Given troponin negative x3 ECG NSR unlikely underlying cardiac ischemia. Will continue telemetry monitor. Monitor for TIA although imaging MRI thus far normal. Switch to Plavix from Aspirin. Appreciate Neuro and Cardiology recs. Continue rest as above. Agree with plan and exam as above with following: In brief 61 y/o M with PMH of DM1, hypercholesterolemia, and gastroparesis, presents to the ED with L arm and L ankle/foot numbness/tingling x1 day. Patient felt well overnight but this morning again had numbness and cold left arm. He was not able to tolerate carotid ultrasound. Discussed case with Dr. Corona given symptoms concerning for subclavian stenosis. Dr. Corona reviewed CT with radiology and determined no stenosis of subclavian arteries. Patient evaluated by Cardiology. ECG NSR. Switched to high dose statin given increase ASCVD score. Pending ECHO. Given troponin negative x3 ECG NSR unlikely underlying cardiac ischemia. Will continue telemetry monitor. Monitor for TIA although imaging MRI thus far normal. Switch to Plavix from Aspirin. Pending upper extremity arterial ultrasound assess for PAD. Appreciate Neuro and Cardiology recs. Continue rest as above.

## 2017-08-31 NOTE — CONSULT NOTE ADULT - PROBLEM SELECTOR RECOMMENDATION 2
patient on zocor 10mg at home. Given his longstanding diabetes I recommend switching to atorvastatin 40mg daily.

## 2017-08-31 NOTE — PROGRESS NOTE ADULT - ASSESSMENT
63 y/o M with PMH of DM1, hypercholesterolemia, and gastroparesis, presents to the ED with L arm and L ankle/foot numbness/tingling x1 day, admitted for r/o TIA and r/o ACS.

## 2017-08-31 NOTE — PROGRESS NOTE ADULT - PROBLEM SELECTOR PLAN 2
-Cardio (Dr. Cook) following, appreciate recs  -Neurology (Dr. Rose) following, appreciate recs  -Neurochecks q6h  -f/u echo  -f/u carotid doppler  -TSH pending  -Lipid panel normal

## 2017-08-31 NOTE — CONSULT NOTE ADULT - PROBLEM SELECTOR RECOMMENDATION 9
rule out TIA. MRI showed no acute stroke. symptoms resolved now. pending 2D echocardiogram and ultrasound of the carotid arteries. Continue aspirin and statin therapy. Awaiting neurology recommendations.

## 2017-09-01 ENCOUNTER — TRANSCRIPTION ENCOUNTER (OUTPATIENT)
Age: 62
End: 2017-09-01

## 2017-09-01 VITALS
OXYGEN SATURATION: 97 % | RESPIRATION RATE: 16 BRPM | DIASTOLIC BLOOD PRESSURE: 73 MMHG | SYSTOLIC BLOOD PRESSURE: 122 MMHG | TEMPERATURE: 98 F | HEART RATE: 60 BPM

## 2017-09-01 LAB
ANION GAP SERPL CALC-SCNC: 8 MMOL/L — SIGNIFICANT CHANGE UP (ref 5–17)
BUN SERPL-MCNC: 15 MG/DL — SIGNIFICANT CHANGE UP (ref 7–23)
CALCIUM SERPL-MCNC: 9.1 MG/DL — SIGNIFICANT CHANGE UP (ref 8.5–10.1)
CHLORIDE SERPL-SCNC: 107 MMOL/L — SIGNIFICANT CHANGE UP (ref 96–108)
CO2 SERPL-SCNC: 29 MMOL/L — SIGNIFICANT CHANGE UP (ref 22–31)
CREAT SERPL-MCNC: 0.87 MG/DL — SIGNIFICANT CHANGE UP (ref 0.5–1.3)
GLUCOSE SERPL-MCNC: 64 MG/DL — LOW (ref 70–99)
HCT VFR BLD CALC: 39.7 % — SIGNIFICANT CHANGE UP (ref 39–50)
HGB BLD-MCNC: 13 G/DL — SIGNIFICANT CHANGE UP (ref 13–17)
MCHC RBC-ENTMCNC: 29.9 PG — SIGNIFICANT CHANGE UP (ref 27–34)
MCHC RBC-ENTMCNC: 32.7 GM/DL — SIGNIFICANT CHANGE UP (ref 32–36)
MCV RBC AUTO: 91.4 FL — SIGNIFICANT CHANGE UP (ref 80–100)
PLATELET # BLD AUTO: 206 K/UL — SIGNIFICANT CHANGE UP (ref 150–400)
POTASSIUM SERPL-MCNC: 4.6 MMOL/L — SIGNIFICANT CHANGE UP (ref 3.5–5.3)
POTASSIUM SERPL-SCNC: 4.6 MMOL/L — SIGNIFICANT CHANGE UP (ref 3.5–5.3)
RBC # BLD: 4.35 M/UL — SIGNIFICANT CHANGE UP (ref 4.2–5.8)
RBC # FLD: 13.3 % — SIGNIFICANT CHANGE UP (ref 10.3–14.5)
SODIUM SERPL-SCNC: 144 MMOL/L — SIGNIFICANT CHANGE UP (ref 135–145)
WBC # BLD: 7.4 K/UL — SIGNIFICANT CHANGE UP (ref 3.8–10.5)
WBC # FLD AUTO: 7.4 K/UL — SIGNIFICANT CHANGE UP (ref 3.8–10.5)

## 2017-09-01 PROCEDURE — 99285 EMERGENCY DEPT VISIT HI MDM: CPT | Mod: 25

## 2017-09-01 PROCEDURE — 82550 ASSAY OF CK (CPK): CPT

## 2017-09-01 PROCEDURE — 80053 COMPREHEN METABOLIC PANEL: CPT

## 2017-09-01 PROCEDURE — 84478 ASSAY OF TRIGLYCERIDES: CPT

## 2017-09-01 PROCEDURE — 85379 FIBRIN DEGRADATION QUANT: CPT

## 2017-09-01 PROCEDURE — 70551 MRI BRAIN STEM W/O DYE: CPT

## 2017-09-01 PROCEDURE — 70450 CT HEAD/BRAIN W/O DYE: CPT

## 2017-09-01 PROCEDURE — 93931 UPPER EXTREMITY STUDY: CPT

## 2017-09-01 PROCEDURE — 71045 X-RAY EXAM CHEST 1 VIEW: CPT

## 2017-09-01 PROCEDURE — 83880 ASSAY OF NATRIURETIC PEPTIDE: CPT

## 2017-09-01 PROCEDURE — 83718 ASSAY OF LIPOPROTEIN: CPT

## 2017-09-01 PROCEDURE — 83721 ASSAY OF BLOOD LIPOPROTEIN: CPT

## 2017-09-01 PROCEDURE — 96372 THER/PROPH/DIAG INJ SC/IM: CPT

## 2017-09-01 PROCEDURE — 82465 ASSAY BLD/SERUM CHOLESTEROL: CPT

## 2017-09-01 PROCEDURE — 84443 ASSAY THYROID STIM HORMONE: CPT

## 2017-09-01 PROCEDURE — 85610 PROTHROMBIN TIME: CPT

## 2017-09-01 PROCEDURE — 93005 ELECTROCARDIOGRAM TRACING: CPT

## 2017-09-01 PROCEDURE — 80048 BASIC METABOLIC PNL TOTAL CA: CPT

## 2017-09-01 PROCEDURE — 71275 CT ANGIOGRAPHY CHEST: CPT

## 2017-09-01 PROCEDURE — 85730 THROMBOPLASTIN TIME PARTIAL: CPT

## 2017-09-01 PROCEDURE — 99239 HOSP IP/OBS DSCHRG MGMT >30: CPT

## 2017-09-01 PROCEDURE — 82553 CREATINE MB FRACTION: CPT

## 2017-09-01 PROCEDURE — 86618 LYME DISEASE ANTIBODY: CPT

## 2017-09-01 PROCEDURE — 84484 ASSAY OF TROPONIN QUANT: CPT

## 2017-09-01 PROCEDURE — 85027 COMPLETE CBC AUTOMATED: CPT

## 2017-09-01 RX ORDER — ACETAMINOPHEN 500 MG
650 TABLET ORAL EVERY 6 HOURS
Qty: 0 | Refills: 0 | Status: DISCONTINUED | OUTPATIENT
Start: 2017-09-01 | End: 2017-09-01

## 2017-09-01 RX ORDER — CLOPIDOGREL BISULFATE 75 MG/1
1 TABLET, FILM COATED ORAL
Qty: 30 | Refills: 0 | OUTPATIENT
Start: 2017-09-01 | End: 2017-10-01

## 2017-09-01 RX ORDER — ATORVASTATIN CALCIUM 80 MG/1
1 TABLET, FILM COATED ORAL
Qty: 30 | Refills: 0
Start: 2017-09-01 | End: 2017-10-01

## 2017-09-01 RX ORDER — ATORVASTATIN CALCIUM 80 MG/1
1 TABLET, FILM COATED ORAL
Qty: 30 | Refills: 0 | OUTPATIENT
Start: 2017-09-01 | End: 2017-10-01

## 2017-09-01 RX ORDER — CLOPIDOGREL BISULFATE 75 MG/1
1 TABLET, FILM COATED ORAL
Qty: 30 | Refills: 0
Start: 2017-09-01 | End: 2017-10-01

## 2017-09-01 RX ORDER — INSULIN GLARGINE 100 [IU]/ML
30 INJECTION, SOLUTION SUBCUTANEOUS EVERY MORNING
Qty: 0 | Refills: 0 | Status: DISCONTINUED | OUTPATIENT
Start: 2017-09-01 | End: 2017-09-01

## 2017-09-01 RX ADMIN — Medication 4: at 11:47

## 2017-09-01 RX ADMIN — ATORVASTATIN CALCIUM 40 MILLIGRAM(S): 80 TABLET, FILM COATED ORAL at 08:00

## 2017-09-01 RX ADMIN — PANTOPRAZOLE SODIUM 40 MILLIGRAM(S): 20 TABLET, DELAYED RELEASE ORAL at 05:11

## 2017-09-01 RX ADMIN — CLOPIDOGREL BISULFATE 75 MILLIGRAM(S): 75 TABLET, FILM COATED ORAL at 11:48

## 2017-09-01 RX ADMIN — Medication 650 MILLIGRAM(S): at 07:57

## 2017-09-01 RX ADMIN — Medication 650 MILLIGRAM(S): at 08:58

## 2017-09-01 RX ADMIN — INSULIN GLARGINE 30 UNIT(S): 100 INJECTION, SOLUTION SUBCUTANEOUS at 10:01

## 2017-09-01 NOTE — PROGRESS NOTE ADULT - PROBLEM SELECTOR PLAN 2
-Cardio (Dr. Cook) following, appreciate recs  -Neurology (Dr. Rose) following, appreciate recs  -Neurochecks q6h  -f/u echo outpatient  -TSH normal  -Lipid panel normal

## 2017-09-01 NOTE — CONSULT NOTE ADULT - PROBLEM SELECTOR RECOMMENDATION 9
cont lantus 30 units qam  cont humalog scale coverage  goal 100-180; ada diet; to f/u with outpatient endocrinologist, dr. bueno

## 2017-09-01 NOTE — DISCHARGE NOTE ADULT - MEDICATION SUMMARY - MEDICATIONS TO TAKE
I will START or STAY ON the medications listed below when I get home from the hospital:    Flomax 0.4 mg oral capsule  -- 0.4 milligram(s) by mouth once a day  -- Indication: For BPH (benign prostatic hyperplasia)    HumaLOG  -- 4 unit(s) subcutaneous 3 times a day (with meals), As Needed  --  2 - 4 units for blood sugar over 130  -- Indication: For Diabetes    Basaglar KwikPen 100 units/mL subcutaneous solution  -- 36 unit(s) subcutaneous once a day (in the morning)  -- Indication: For Diabetes    Januvia 100 mg oral tablet  -- 1 tab(s) by mouth once a day  -- Indication: For Diabetes    atorvastatin 40 mg oral tablet  -- 1 tab(s) by mouth once a day (at bedtime)  -- Indication: For Hypercholesteremia    clopidogrel 75 mg oral tablet  -- 1 tab(s) by mouth once a day  -- Indication: For Cardioprotection    omeprazole 40 mg oral delayed release capsule  -- 1 cap(s) by mouth 2 times a day  -- Indication: For GERD

## 2017-09-01 NOTE — DISCHARGE NOTE ADULT - PLAN OF CARE
Resolved Please continue medications as above. Please f/u with neurologist within 1 week, please f/u with PMD within 1 week. Please continue medications as above. Please f/u with endocrinologist within 1 week. Stable Please continue medications as above. Please continue medications as above. Please f/u with neurologist and cardiologist within 1 week, please f/u with PMD within 1 week. Please continue medications as above. Please f/u with neurologist and cardiologist (for echo) within 1 week, please f/u with PMD within 1 week.

## 2017-09-01 NOTE — DISCHARGE NOTE ADULT - CARE PLAN
Principal Discharge DX:	Numbness and tingling in left arm  Goal:	Resolved  Instructions for follow-up, activity and diet:	Please continue medications as above. Please f/u with neurologist within 1 week, please f/u with PMD within 1 week.  Secondary Diagnosis:	Type 1 diabetes mellitus without complication  Goal:	Stable  Instructions for follow-up, activity and diet:	Please continue medications as above. Please f/u with endocrinologist within 1 week.  Secondary Diagnosis:	Hypercholesteremia  Goal:	Stable  Instructions for follow-up, activity and diet:	Please continue medications as above.  Secondary Diagnosis:	BPH (benign prostatic hyperplasia)  Goal:	Stable  Instructions for follow-up, activity and diet:	Please continue medications as above. Principal Discharge DX:	Numbness and tingling in left arm  Goal:	Resolved  Instructions for follow-up, activity and diet:	Please continue medications as above. Please f/u with neurologist and cardiologist within 1 week, please f/u with PMD within 1 week.  Secondary Diagnosis:	Type 1 diabetes mellitus without complication  Goal:	Stable  Instructions for follow-up, activity and diet:	Please continue medications as above. Please f/u with endocrinologist within 1 week.  Secondary Diagnosis:	Hypercholesteremia  Goal:	Stable  Instructions for follow-up, activity and diet:	Please continue medications as above.  Secondary Diagnosis:	BPH (benign prostatic hyperplasia)  Goal:	Stable  Instructions for follow-up, activity and diet:	Please continue medications as above. Principal Discharge DX:	Numbness and tingling in left arm  Goal:	Resolved  Instructions for follow-up, activity and diet:	Please continue medications as above. Please f/u with neurologist and cardiologist (for echo) within 1 week, please f/u with PMD within 1 week.  Secondary Diagnosis:	Type 1 diabetes mellitus without complication  Goal:	Stable  Instructions for follow-up, activity and diet:	Please continue medications as above. Please f/u with endocrinologist within 1 week.  Secondary Diagnosis:	Hypercholesteremia  Goal:	Stable  Instructions for follow-up, activity and diet:	Please continue medications as above.  Secondary Diagnosis:	BPH (benign prostatic hyperplasia)  Goal:	Stable  Instructions for follow-up, activity and diet:	Please continue medications as above.

## 2017-09-01 NOTE — DISCHARGE NOTE ADULT - ADDITIONAL INSTRUCTIONS
Please f/u with neurologist within 1 week, please f/u with cardiologist within 1 week, please f/u with PMD within 1 week. Statin changed to Lipitor 40mg daily, discontinue ASA, start taking Plavix 75mg daily. Please f/u with neurologist within 1 week, please f/u with cardiologist (for outpatient ECHO) within 1 week, please f/u with PMD within 1 week. Statin changed to Lipitor 40mg daily, discontinue ASA, start taking Plavix 75mg daily. Please f/u with neurologist within 1 week, please f/u with cardiologist (for outpatient ECHO) within 1 week, please f/u with PMD within 1 week. Statin changed to Lipitor 40mg daily, discontinue ASA, start taking Plavix 75mg daily.  pt to make all follow up appts

## 2017-09-01 NOTE — PROGRESS NOTE ADULT - PROBLEM SELECTOR PROBLEM 1
R/O ACS (acute coronary syndrome)
R/O ACS (acute coronary syndrome)
Transient cerebral ischemia, unspecified type

## 2017-09-01 NOTE — PROGRESS NOTE ADULT - PROBLEM SELECTOR PLAN 1
No recurrent symptoms  Al vascular studies negative  MRI negative  Patient can be D/C home from cardiac standpoint    Echocardiogram can be done as outpatient

## 2017-09-01 NOTE — PROGRESS NOTE ADULT - ASSESSMENT
61 y/o M with PMH of DM1, hypercholesterolemia, and gastroparesis, presents to the ED with L arm and L ankle/foot numbness/tingling x1 day, admitted for r/o TIA and r/o ACS. CT head negative, CTA negative for PE, LUE doppler negative for stenosis.

## 2017-09-01 NOTE — PROGRESS NOTE ADULT - PROBLEM SELECTOR PLAN 1
Unlikely with negative workup  -CT head negative  -CT angio negative for PE, radiologist (Dr. Berrios) did second look and confirmed negative for subclavian stenosis  -MRI negative  -EKG NSR   -LUE doppler negative for stenosis  -Cardio (Dr. Cook) following, appreciate recs  -ASA discontinued  -Started on Plavix  -Continue statin  -Call put out to son Dr. Selvin Thakkar regarding plan 9:17am, will try again.

## 2017-09-01 NOTE — DISCHARGE NOTE ADULT - CARE PROVIDER_API CALL
Last, Minh MIDDLETON (), Internal Medicine  [facility] [place] [address]  Phone: [phone]  Fax: [phone]    Perlman, Craig D (), Medicine  [address] [place] [address]  Phone: [phone]  Fax: [phone]

## 2017-09-01 NOTE — CONSULT NOTE ADULT - SUBJECTIVE AND OBJECTIVE BOX
Patient is a 62y old  Male who presents with a chief complaint of Left arm numbness/tingling (30 Aug 2017 21:55)      Reason For Consult:     HPI:  61 y/o M with PMH of DM1, hypercholesterolemia, and gastroparesis, presents to the ED with L arm and L ankle/foot numbness/tingling x1 day. He states that yesterday around 10pm he began to have numbness/tingling throughout his L arm, he also notes having tightness in the L hand at the time. He states when it began he started to feel very anxious like he couldn't sit still. This episode lasted 1 hour then dissipated. Today he had another episode around 3pm but in addition to his L arm, he felt numbness/tingling in his L foot/ankle, and slight lightheadedness, which lasted 2 hours and resolved while he was in the ED. He denies any previous occurence or any injury/trauma to his arms/legs. During these episodes he denies gait difficulty, weakness, vision changes, slurred speech, dysarthria, or N/V. Pt also requests being tested for lyme disease, pt experiencing morning stiffness and has a family member who lives in the same neighborhood as him who was recently diagnosed with lyme; pt denies having known tick bite.    In the ED pt VS were significant for , /69. Lab significant for glucose 299, CE's neg x1, BNP WNL, D-dimer 264. EKG NSR at 68bpm. Head CT neg for acute intracranial hemorrhage or other intracranial pathology. MRI head negative for acute infarct or other pathology (prelim reading). CTA chest negative for PE. CXR negative for active disease (prelim reading). (30 Aug 2017 19:47)      PAST MEDICAL & SURGICAL HISTORY:  BPH (benign prostatic hyperplasia): not on BPH meds  Gastroparesis  Hypercholesteremia  Diabetes  History of appendectomy      FAMILY HISTORY:  Family history of Alzheimer's disease (Mother): mother,  at 77 y/o 2/2 PNA  Family history of lung cancer (Father): father,  at 87 y/o  Family history of stroke (Father): father @ 79y/o        Social History:    MEDICATIONS  (STANDING):  enoxaparin Injectable 40 milliGRAM(s) SubCutaneous every 24 hours  tamsulosin 0.4 milliGRAM(s) Oral at bedtime  insulin lispro (HumaLOG) corrective regimen sliding scale   SubCutaneous three times a day before meals  insulin lispro (HumaLOG) corrective regimen sliding scale   SubCutaneous at bedtime  dextrose 5%. 1000 milliLiter(s) (50 mL/Hr) IV Continuous <Continuous>  dextrose 50% Injectable 12.5 Gram(s) IV Push once  dextrose 50% Injectable 25 Gram(s) IV Push once  dextrose 50% Injectable 25 Gram(s) IV Push once  pantoprazole    Tablet 40 milliGRAM(s) Oral two times a day before meals  atorvastatin 40 milliGRAM(s) Oral at bedtime  clopidogrel Tablet 75 milliGRAM(s) Oral daily    MEDICATIONS  (PRN):  dextrose Gel 1 Dose(s) Oral once PRN Blood Glucose LESS THAN 70 milliGRAM(s)/deciliter  glucagon  Injectable 1 milliGRAM(s) IntraMuscular once PRN Glucose LESS THAN 70 milligrams/deciliter  acetaminophen   Tablet. 650 milliGRAM(s) Oral every 6 hours PRN Mild Pain (1 - 3)        T(C): 36.6 (17 @ 08:45), Max: 37 (17 @ 15:44)  HR: 67 (17 @ 08:45) (58 - 80)  BP: 127/78 (17 @ 08:45) (114/70 - 148/77)  RR: 16 (17 @ 08:45) (16 - 23)  SpO2: 98% (17 @ 08:45) (96% - 100%)  Wt(kg): --    PHYSICAL EXAM:  GENERAL: NAD, well-groomed, well-developed  HEAD:  Atraumatic, Normocephalic  NECK: Supple, No JVD, Normal thyroid  CHEST/LUNG: Clear to percussion bilaterally; No rales, rhonchi, wheezing, or rubs  HEART: Regular rate and rhythm; No murmurs, rubs, or gallops  ABDOMEN: Soft, Nontender, Nondistended; Bowel sounds present  EXTREMITIES:  2+ Peripheral Pulses, No clubbing, cyanosis, or edema  SKIN: No rashes or lesions    CAPILLARY BLOOD GLUCOSE  85 (01 Sep 2017 07:32)  120 (31 Aug 2017 21:21)  152 (31 Aug 2017 16:54)  193 (31 Aug 2017 10:30)                                13.0   7.4   )-----------( 206      ( 01 Sep 2017 06:04 )             39.7       CMP:   @ 06:04  SGPT --  Albumin --   Alk Phos --   Anion Gap 8   SGOT --   Total Bili --   BUN 15   Calcium Total 9.1   CO2 29   Chloride 107   Creatinine 0.87   eGFR if    eGFR if non AA 91   Glucose 64   Potassium 4.6   Protein --   Sodium 144      Thyroid Function Tests:   @ 05:36 TSH 2.88 FreeT4 -- T3 -- Anti TPO -- Anti Thyroglobulin Ab -- TSI --      Diabetes Tests:       Radiology:

## 2017-09-01 NOTE — DISCHARGE NOTE ADULT - MEDICATION SUMMARY - MEDICATIONS TO STOP TAKING
I will STOP taking the medications listed below when I get home from the hospital:    simvastatin 10 mg oral tablet  -- 1 tab(s) by mouth once a day (at bedtime)    aspirin 81 mg oral tablet  -- 1 tab(s) by mouth once a day

## 2017-09-01 NOTE — DISCHARGE NOTE ADULT - PATIENT PORTAL LINK FT
“You can access the [facility] Patient Portal, offered by [facility], by registering with the following website: [email or web address]”

## 2017-09-01 NOTE — DISCHARGE NOTE ADULT - HOSPITAL COURSE
61 y/o M with PMH of DM1, hypercholesterolemia, and gastroparesis, presented to the ED with L arm and L ankle/foot numbness/tingling x1 day. He stated that around 10pm the previous night he began to have numbness/tingling throughout his L arm, he also noted having tightness in the L hand at the time. He stated when it began he started to feel very anxious like he couldn't sit still. This episode lasted 1 hour then dissipated. The day of admission he had another episode around 3pm but in addition to his L arm, he felt numbness/tingling in his L foot/ankle, and slight lightheadedness, which lasted 2 hours and resolved while he was in the ED. He denied any previous occurrence or any injury/trauma to his arms/legs. During these episodes he denies gait difficulty, weakness, vision changes, slurred speech, dysarthria, or N/V. Pt also requested testing for lyme disease, as pt had morning stiffness and a family member who lives in the same neighborhood as him who was recently diagnosed with lyme; pt denied having known tick bite.    In the ED pt VS were significant for , /69. Lab significant for glucose 299, CE's neg x1, BNP WNL, D-dimer 264. EKG NSR at 68bpm. Head CT neg for acute intracranial hemorrhage or other intracranial pathology. MRI head negative for acute infarct or other pathology (prelim reading). CTA chest negative for PE. CXR negative for active disease.     The patients hospital stay was complicated by another episodes of numbness/tingling of the L. arm and accompanying sever anxiety. The episode occurred while the patient was receiving carotid doppler ultrasound and the test had to be cancelled. During bedside teaching rounds the episode was witnessed by the hospitalist Dr. Galeana who determined that the L. hand was slightly more pale and slightly cooler than the R. side. The patient received 1x dose of Xanax and had no further episodes during his hospital stay.     The patients    Dr. Rose- neurology was consulted and evaluated the patient who was neurologically intact without focal deficits.  Lyme testing came back negative. Pt was unable to tolerate U/S duplex carotid due to discomfort. Pt c/o new episode of L arm numbness and stiffness. Suspecting TIA or subclavian artery stenosis. Pt to have doppler LUE tomorrow. 61 y/o M with PMH of DM1, hypercholesterolemia, and gastroparesis, presented to the ED with L arm and L ankle/foot numbness/tingling x1 day. He stated that around 10pm the previous night he began to have numbness/tingling throughout his L arm, he also noted having tightness in the L hand at the time. He stated when it began he started to feel very anxious like he couldn't sit still. This episode lasted 1 hour then dissipated. The day of admission he had another episode around 3pm but in addition to his L arm, he felt numbness/tingling in his L foot/ankle, and slight lightheadedness, which lasted 2 hours and resolved while he was in the ED. He denied any previous occurrence or any injury/trauma to his arms/legs. During these episodes he denies gait difficulty, weakness, vision changes, slurred speech, dysarthria, or N/V. Pt also requested testing for lyme disease, as pt had morning stiffness and a family member who lives in the same neighborhood as him who was recently diagnosed with lyme; pt denied having known tick bite.    In the ED pt VS were significant for , /69. Lab significant for glucose 299, CE's neg x1, BNP WNL, D-dimer 264. EKG NSR at 68bpm. Head CT neg for acute intracranial hemorrhage or other intracranial pathology. MRI head negative for acute infarct or other pathology (prelim reading). CTA chest negative for PE. CXR negative for active disease.     The patients hospital stay was complicated by another episodes of numbness/tingling of the L. arm and accompanying sever anxiety. The episode occurred while the patient was receiving carotid doppler ultrasound and the test had to be cancelled. During bedside teaching rounds the episode was witnessed by the hospitalist Dr. Galeana who determined that the L. hand was slightly more pale and slightly cooler than the R. side. The episode quickly resolved in within about 20 minutes. The patient received 1x dose of Xanax and had no further episodes during his hospital stay.     Dr. Rose- neurology was consulted and evaluated the patient who was neurologically intact without focal deficits.  Lyme testing was negative. Dr. Rose contacted Radiologist Dr. Berrios who took a second look at the CTA and determined that there was no sign of subclavian artery stenosis. A LUE doppler was performed and was negative.     Dr. Perlman- endocrinologist was consulted to evaluate the patients insulin regimen as he had a fingerstick reading of 85 in the morning. Dr. Perlman recommended keeping him on the same regimen.     A call was put out to the patients son and PMD to discuss findings and further planning. The patient was asked to f/u with a neurologist and cardiologist within 1 week. He expressed the desire to sit down with his son who is an ER physician to help him find these specialists. He was also asked to f/u with his PMD within 1 week.  He should also f/u with his endocrinologist Dr. Perlman within 1 week. Should he have new or worsening symptoms he should return to the ED. 63 y/o M with PMH of DM1, hypercholesterolemia, and gastroparesis, presented to the ED with L arm and L ankle/foot numbness/tingling x1 day. He stated that around 10pm the previous night he began to have numbness/tingling throughout his L arm, he also noted having tightness in the L hand at the time. He stated when it began he started to feel very anxious like he couldn't sit still. This episode lasted 1 hour then dissipated. The day of admission he had another episode around 3pm but in addition to his L arm, he felt numbness/tingling in his L foot/ankle, and slight lightheadedness, which lasted 2 hours and resolved while he was in the ED. He denied any previous occurrence or any injury/trauma to his arms/legs. During these episodes he denies gait difficulty, weakness, vision changes, slurred speech, dysarthria, or N/V. Pt also requested testing for lyme disease, as pt had morning stiffness and a family member who lives in the same neighborhood as him who was recently diagnosed with lyme; pt denied having known tick bite.    In the ED pt VS were significant for , /69. Lab significant for glucose 299, CE's neg x1, BNP WNL, D-dimer 264. EKG NSR at 68bpm. Head CT neg for acute intracranial hemorrhage or other intracranial pathology. MRI head negative for acute infarct or other pathology (prelim reading). CTA chest negative for PE. CXR negative for active disease.     The patients hospital stay was complicated by another episodes of numbness/tingling of the L. arm and accompanying sever anxiety. The episode occurred while the patient was receiving carotid doppler ultrasound and the test had to be cancelled. During bedside teaching rounds the episode was witnessed by the hospitalist Dr. Galeana who determined that the L. hand was slightly more pale and slightly cooler than the R. side. The episode quickly resolved in within about 20 minutes. The patient received 1x dose of Xanax and had no further episodes during his hospital stay.     Dr. Rose- neurology was consulted and evaluated the patient who was neurologically intact without focal deficits.  Lyme testing was negative. Dr. Rose contacted Radiologist Dr. Berrios who took a second look at the CTA and determined that there was no sign of subclavian artery stenosis. A LUE doppler was performed and was negative.     Dr. Perlman- endocrinologist was consulted to evaluate the patients insulin regimen as he had a fingerstick reading of 85 in the morning. Dr. Perlman recommended keeping him on the same regimen.     A call was put out to the patients son and PMD to discuss findings and further planning. The patient was asked to f/u with a neurologist within 1 week and a cardiologist within 1 week for an outpatient ECHO. He expressed the desire to sit down with his son who is an ER physician to help him find these specialists. He was also asked to f/u with his PMD within 1 week.  He should also f/u with his endocrinologist Dr. Perlman within 1 week. Should he have new or worsening symptoms he should return to the ED. 63 y/o M with PMH of DM1, hypercholesterolemia, and gastroparesis, presented to the ED with L arm and L ankle/foot numbness/tingling x1 day. He stated that around 10pm the previous night he began to have numbness/tingling throughout his L arm, he also noted having tightness in the L hand at the time. He stated when it began he started to feel very anxious like he couldn't sit still. This episode lasted 1 hour then dissipated. The day of admission he had another episode around 3pm but in addition to his L arm, he felt numbness/tingling in his L foot/ankle, and slight lightheadedness, which lasted 2 hours and resolved while he was in the ED. He denied any previous occurrence or any injury/trauma to his arms/legs. During these episodes he denies gait difficulty, weakness, vision changes, slurred speech, dysarthria, or N/V. Pt also requested testing for lyme disease, as pt had morning stiffness and a family member who lives in the same neighborhood as him who was recently diagnosed with lyme; pt denied having known tick bite.    In the ED pt VS were significant for , /69. Lab significant for glucose 299, CE's neg x1, BNP WNL, D-dimer 264. EKG NSR at 68bpm. Head CT neg for acute intracranial hemorrhage or other intracranial pathology. MRI head negative for acute infarct or other pathology (prelim reading). CTA chest negative for PE. CXR negative for active disease.     The patients hospital stay was complicated by another episodes of numbness/tingling of the L. arm and accompanying sever anxiety. The episode occurred while the patient was receiving carotid doppler ultrasound and the test had to be cancelled. During bedside teaching rounds the episode was witnessed by the hospitalist Dr. Galeana who determined that the L. hand was slightly more pale and slightly cooler than the R. side. The episode quickly resolved in within about 20 minutes. The patient received 1x dose of Xanax and had no further episodes during his hospital stay.     Dr. Rose- neurology was consulted and evaluated the patient who was neurologically intact without focal deficits.  Lyme testing was negative. Dr. Rose contacted Radiologist Dr. Berrios who took a second look at the CTA and determined that there was no sign of subclavian artery stenosis. A LUE doppler was performed and was negative.     Dr. Perlman- endocrinologist was consulted to evaluate the patients insulin regimen as he had a fingerstick reading of 85 in the morning. Dr. Perlman recommended keeping him on the same regimen.     A call was put out to the patients son and PMD to discuss findings and further planning. The patient was asked to f/u with a neurologist within 1 week and a cardiologist within 1 week for an outpatient ECHO. He expressed the desire to sit down with his son who is an ER physician to help him find these specialists. He was also asked to f/u with his PMD within 1 week.  He should also f/u with his endocrinologist Dr. Perlman within 1 week. Should he have new or worsening symptoms he should return to the ED.      Time spent: 60 minutes  PMD informed of discharge

## 2017-09-01 NOTE — PROGRESS NOTE ADULT - SUBJECTIVE AND OBJECTIVE BOX
CHIEF COMPLAINT: Patient is a 62y old  Male who presents with a chief complaint of Left arm numbness/tingling (30 Aug 2017 21:55)      HPI:  63 y/o M with PMH of DM1, hypercholesterolemia, and gastroparesis, presents to the ED with L arm and L ankle/foot numbness/tingling x1 day. He states that yesterday around 10pm he began to have numbness/tingling throughout his L arm, he also notes having tightness in the L hand at the time. He states when it began he started to feel very anxious like he couldn't sit still. This episode lasted 1 hour then dissipated. Today he had another episode around 3pm but in addition to his L arm, he felt numbness/tingling in his L foot/ankle, and slight lightheadedness, which lasted 2 hours and resolved while he was in the ED. He denies any previous occurence or any injury/trauma to his arms/legs. During these episodes he denies gait difficulty, weakness, vision changes, slurred speech, dysarthria, or N/V. Pt also requests being tested for lyme disease, pt experiencing morning stiffness and has a family member who lives in the same neighborhood as him who was recently diagnosed with lyme; pt denies having known tick bite.    Subjective:    Feels well. No complaints of numbness of extremities    MEDICATIONS  (STANDING):  enoxaparin Injectable 40 milliGRAM(s) SubCutaneous every 24 hours  tamsulosin 0.4 milliGRAM(s) Oral at bedtime  insulin lispro (HumaLOG) corrective regimen sliding scale   SubCutaneous three times a day before meals  insulin lispro (HumaLOG) corrective regimen sliding scale   SubCutaneous at bedtime  dextrose 5%. 1000 milliLiter(s) (50 mL/Hr) IV Continuous <Continuous>  dextrose 50% Injectable 12.5 Gram(s) IV Push once  dextrose 50% Injectable 25 Gram(s) IV Push once  dextrose 50% Injectable 25 Gram(s) IV Push once  pantoprazole    Tablet 40 milliGRAM(s) Oral two times a day before meals  atorvastatin 40 milliGRAM(s) Oral at bedtime  clopidogrel Tablet 75 milliGRAM(s) Oral daily  insulin glargine Injectable (LANTUS) 30 Unit(s) SubCutaneous every morning    MEDICATIONS  (PRN):  dextrose Gel 1 Dose(s) Oral once PRN Blood Glucose LESS THAN 70 milliGRAM(s)/deciliter  glucagon  Injectable 1 milliGRAM(s) IntraMuscular once PRN Glucose LESS THAN 70 milligrams/deciliter  acetaminophen   Tablet. 650 milliGRAM(s) Oral every 6 hours PRN Mild Pain (1 - 3)      Allergies    penicillin (Rash)  sulfa drugs (Rash)    Intolerances        VITAL SIGNS:   Vital Signs Last 24 Hrs  T(C): 36.6 (01 Sep 2017 08:45), Max: 37 (31 Aug 2017 15:44)  T(F): 97.9 (01 Sep 2017 08:45), Max: 98.6 (31 Aug 2017 15:44)  HR: 67 (01 Sep 2017 08:45) (58 - 80)  BP: 127/78 (01 Sep 2017 08:45) (114/70 - 148/77)  BP(mean): --  RR: 16 (01 Sep 2017 08:45) (16 - 23)  SpO2: 98% (01 Sep 2017 08:45) (96% - 100%)    I&O's Summary    31 Aug 2017 07:01  -  01 Sep 2017 07:00  --------------------------------------------------------  IN: 1340 mL / OUT: 0 mL / NET: 1340 mL        PHYSICAL EXAM:    Constitutional: NAD, awake and alert, well-developed  Eyes:  EOMI,  Pupils round, No oral cyanosis.  Pulmonary: Non-labored, breath sounds are clear bilaterally, No wheezing, rales or rhonchi  Cardiovascular: S1 and S2, regular rate and rhythm, no Murmurs, gallops or rubs  Gastrointestinal: Bowel Sounds present, soft, nontender.   Lymph: No peripheral edema. No cervical lymphadenopathy.  Neurological: Alert, no focal deficits  Skin: No rashes.  Psych:  Mood & affect appropriate    LABS: All Labs Reviewed:                        13.0   7.4   )-----------( 206      ( 01 Sep 2017 06:04 )             39.7                         11.7   7.8   )-----------( 183      ( 31 Aug 2017 05:36 )             36.1                         12.5   7.3   )-----------( 218      ( 30 Aug 2017 17:15 )             39.9     01 Sep 2017 06:04    144    |  107    |  15     ----------------------------<  64     4.6     |  29     |  0.87   31 Aug 2017 05:36    140    |  106    |  16     ----------------------------<  257    4.4     |  27     |  0.89   30 Aug 2017 17:15    139    |  104    |  21     ----------------------------<  299    4.7     |  28     |  0.97     Ca    9.1        01 Sep 2017 06:04  Ca    8.5        31 Aug 2017 05:36  Ca    8.9        30 Aug 2017 17:15    TPro  7.4    /  Alb  3.9    /  TBili  0.4    /  DBili  x      /  AST  17     /  ALT  31     /  AlkPhos  83     30 Aug 2017 17:15    PT/INR - ( 30 Aug 2017 17:15 )   PT: 10.3 sec;   INR: 0.95 ratio         PTT - ( 30 Aug 2017 17:15 )  PTT:29.2 sec  CARDIAC MARKERS ( 31 Aug 2017 08:31 )  <.015 ng/mL / x     / 88 U/L / x     / 1.5 ng/mL  CARDIAC MARKERS ( 31 Aug 2017 00:32 )  <.015 ng/mL / x     / 107 U/L / x     / 1.6 ng/mL  CARDIAC MARKERS ( 30 Aug 2017 17:15 )  <.015 ng/mL / x     / 128 U/L / x     / 2.4 ng/mL      08-30 @ 17:15  Pro Bnp 88 08-31 @ 05:36  TSH: 2.88
INTERVAL HPI/OVERNIGHT EVENTS: No acute events overnight and no further episodes of numbness/tingling. Pt seen and examined at bedside. Pt states he feels well and did not require more anti-anxiety medications since yesterday. He is eating and voiding well. Denies CP, SOB, palpitations, weakness, visual changes, difficulty speaking or swallowing, or difficulty ambulating.     MEDICATIONS  (STANDING):  enoxaparin Injectable 40 milliGRAM(s) SubCutaneous every 24 hours  tamsulosin 0.4 milliGRAM(s) Oral at bedtime  insulin lispro (HumaLOG) corrective regimen sliding scale   SubCutaneous three times a day before meals  insulin lispro (HumaLOG) corrective regimen sliding scale   SubCutaneous at bedtime  dextrose 5%. 1000 milliLiter(s) (50 mL/Hr) IV Continuous <Continuous>  dextrose 50% Injectable 12.5 Gram(s) IV Push once  dextrose 50% Injectable 25 Gram(s) IV Push once  dextrose 50% Injectable 25 Gram(s) IV Push once  pantoprazole    Tablet 40 milliGRAM(s) Oral two times a day before meals  atorvastatin 40 milliGRAM(s) Oral at bedtime  clopidogrel Tablet 75 milliGRAM(s) Oral daily  insulin glargine Injectable (LANTUS) 30 Unit(s) SubCutaneous every morning    MEDICATIONS  (PRN):  dextrose Gel 1 Dose(s) Oral once PRN Blood Glucose LESS THAN 70 milliGRAM(s)/deciliter  glucagon  Injectable 1 milliGRAM(s) IntraMuscular once PRN Glucose LESS THAN 70 milligrams/deciliter  acetaminophen   Tablet. 650 milliGRAM(s) Oral every 6 hours PRN Mild Pain (1 - 3)    Allergies    penicillin (Rash)  sulfa drugs (Rash)    Intolerances      CONSTITUTIONAL: No weakness, fevers or chills  EYES/ENT: No visual changes;  No vertigo or throat pain   RESPIRATORY: No cough, wheezing, hemoptysis; No shortness of breath  CARDIOVASCULAR: No chest pain or palpitations  GASTROINTESTINAL: No abdominal or epigastric pain. No nausea, vomiting. No diarrhea or constipation.   GENITOURINARY: No dysuria, frequency or hematuria  NEUROLOGICAL: No numbness or weakness- completely resolved  EXTREMITIES: denies edema, calf pain  All other review of systems is negative unless indicated above.    Vital Signs Last 24 Hrs  T(C): 36.6 (01 Sep 2017 08:45), Max: 37 (31 Aug 2017 15:44)  T(F): 97.9 (01 Sep 2017 08:45), Max: 98.6 (31 Aug 2017 15:44)  HR: 67 (01 Sep 2017 08:45) (58 - 79)  BP: 127/78 (01 Sep 2017 08:45) (114/70 - 148/77)  BP(mean): --  RR: 16 (01 Sep 2017 08:45) (16 - 23)  SpO2: 98% (01 Sep 2017 08:45) (96% - 99%)      31 Aug 2017 07:01  -  01 Sep 2017 07:00  --------------------------------------------------------  IN:    Oral Fluid: 1340 mL  Total IN: 1340 mL    OUT:  Total OUT: 0 mL    Total NET: 1340 mL      01 Sep 2017 07:01  -  01 Sep 2017 10:38  --------------------------------------------------------  IN:    Oral Fluid: 240 mL  Total IN: 240 mL    OUT:  Total OUT: 0 mL    Total NET: 240 mL      General: NAD resting comfortably in bed  Neurology: CN II-XII intact, A&Ox3, nonfocal, 5/5 b/l UE and LE, sensation intact, hand grasp 5/5 b/l  Respiratory: CTA B/L  CV: RRR, S1S2, no murmurs, rubs or gallops  Abdominal: Soft, NT, ND +BS, Last BM  Extremities: No edema, + peripheral pulses, Roseanna sign negative b/l      LABS:                                 13.0   7.4   )-----------( 206      ( 01 Sep 2017 06:04 )             39.7   09-01    144  |  107  |  15  ----------------------------<  64<L>  4.6   |  29  |  0.87    Ca    9.1      01 Sep 2017 06:04    TPro  7.4  /  Alb  3.9  /  TBili  0.4  /  DBili  x   /  AST  17  /  ALT  31  /  AlkPhos  83  08-30      RADIOLOGY & ADDITIONAL TESTS:    < from: US Duplex Arterial Upper Ext Ltd, Left (08.31.17 @ 16:43) >  IMPRESSION:  No evidence of arterial occlusion or significant underlying   vascular disease.        < from: MRI Head w/o Cont (08.30.17 @ 18:43) >  IMPRESSION:      1) no diffusion restriction or MR evidence of acute ischemia..  2)  no space-occupying lesion identified..          < from: CT Angio Chest w/ IV Cont (08.30.17 @ 18:20) >  IMPRESSION: No acute pulmonary embolus.         < from: CT Head No Cont (08.30.17 @ 17:46) >  IMPRESSION:    1)  unremarkable CT study of the brain.  2)  clear sinuses and mastoids..        < from: Xray Chest 1 View AP- PORTABLE-Urgent (08.30.17 @ 17:05) >  IMPRESSION:    Clear lungs.
INTERVAL HPI/OVERNIGHT EVENTS: No acute events overnight. Pt seen and examined at bedside. Pt reports that his symptoms of numbness and tingling of L. arm/hand, and L. leg/foot have completely resolved. Denies weakness, visual changes, difficulty speaking or swallowing.     MEDICATIONS  (STANDING):  enoxaparin Injectable 40 milliGRAM(s) SubCutaneous every 24 hours  aspirin enteric coated 81 milliGRAM(s) Oral daily  tamsulosin 0.4 milliGRAM(s) Oral at bedtime  insulin glargine Injectable (LANTUS) 36 Unit(s) SubCutaneous every morning  insulin lispro (HumaLOG) corrective regimen sliding scale   SubCutaneous three times a day before meals  insulin lispro (HumaLOG) corrective regimen sliding scale   SubCutaneous at bedtime  dextrose 5%. 1000 milliLiter(s) (50 mL/Hr) IV Continuous <Continuous>  dextrose 50% Injectable 12.5 Gram(s) IV Push once  dextrose 50% Injectable 25 Gram(s) IV Push once  dextrose 50% Injectable 25 Gram(s) IV Push once  pantoprazole    Tablet 40 milliGRAM(s) Oral two times a day before meals  atorvastatin 40 milliGRAM(s) Oral at bedtime    MEDICATIONS  (PRN):  dextrose Gel 1 Dose(s) Oral once PRN Blood Glucose LESS THAN 70 milliGRAM(s)/deciliter  glucagon  Injectable 1 milliGRAM(s) IntraMuscular once PRN Glucose LESS THAN 70 milligrams/deciliter      Allergies    penicillin (Rash)  sulfa drugs (Rash)    Intolerances      CONSTITUTIONAL: No weakness, fevers or chills  EYES/ENT: No visual changes;  No vertigo or throat pain   RESPIRATORY: No cough, wheezing, hemoptysis; No shortness of breath  CARDIOVASCULAR: No chest pain or palpitations  GASTROINTESTINAL: No abdominal or epigastric pain. No nausea, vomiting. No diarrhea or constipation.   GENITOURINARY: No dysuria, frequency or hematuria  NEUROLOGICAL: No numbness or weakness  SKIN: No itching, burning, rashes, or lesions   All other review of systems is negative unless indicated above.    Vital Signs Last 24 Hrs  T(C): 36.3 (31 Aug 2017 07:16), Max: 36.8 (30 Aug 2017 16:13)  T(F): 97.3 (31 Aug 2017 07:16), Max: 98.3 (30 Aug 2017 16:13)  HR: 64 (31 Aug 2017 07:16) (58 - 103)  BP: 122/70 (31 Aug 2017 07:16) (99/58 - 166/69)  BP(mean): --  RR: 16 (31 Aug 2017 07:16) (15 - 19)  SpO2: 97% (31 Aug 2017 07:16) (96% - 99%)      31 Aug 2017 07:01  -  31 Aug 2017 09:46  --------------------------------------------------------  IN:    Oral Fluid: 480 mL  Total IN: 480 mL    OUT:  Total OUT: 0 mL    Total NET: 480 mL      General: WN/WD NAD  Neurology: A&Ox3, nonfocal, 5/5 b/l UE and LE, sensation intact, hand grasp 5/5 b/l  Respiratory: CTA B/L  CV: RRR, S1S2, no murmurs, rubs or gallops  Abdominal: Soft, NT, ND +BS, Last BM  Extremities: No edema, + peripheral pulses      LABS:                        11.7   7.8   )-----------( 183      ( 31 Aug 2017 05:36 )             36.1     08-31    140  |  106  |  16  ----------------------------<  257<H>  4.4   |  27  |  0.89    Ca    8.5      31 Aug 2017 05:36    TPro  7.4  /  Alb  3.9  /  TBili  0.4  /  DBili  x   /  AST  17  /  ALT  31  /  AlkPhos  83  08-30    PT/INR - ( 30 Aug 2017 17:15 )   PT: 10.3 sec;   INR: 0.95 ratio         PTT - ( 30 Aug 2017 17:15 )  PTT:29.2 sec      RADIOLOGY & ADDITIONAL TESTS:
Neurology follow up note    MARIAH VELASCOyMale      Interval History:    Patient feels ok no new complaints.    MEDICATIONS    enoxaparin Injectable 40 milliGRAM(s) SubCutaneous every 24 hours  tamsulosin 0.4 milliGRAM(s) Oral at bedtime  insulin lispro (HumaLOG) corrective regimen sliding scale   SubCutaneous three times a day before meals  insulin lispro (HumaLOG) corrective regimen sliding scale   SubCutaneous at bedtime  dextrose 5%. 1000 milliLiter(s) IV Continuous <Continuous>  dextrose Gel 1 Dose(s) Oral once PRN  dextrose 50% Injectable 12.5 Gram(s) IV Push once  dextrose 50% Injectable 25 Gram(s) IV Push once  dextrose 50% Injectable 25 Gram(s) IV Push once  glucagon  Injectable 1 milliGRAM(s) IntraMuscular once PRN  pantoprazole    Tablet 40 milliGRAM(s) Oral two times a day before meals  atorvastatin 40 milliGRAM(s) Oral at bedtime  clopidogrel Tablet 75 milliGRAM(s) Oral daily  acetaminophen   Tablet. 650 milliGRAM(s) Oral every 6 hours PRN  insulin glargine Injectable (LANTUS) 30 Unit(s) SubCutaneous every morning      Allergies    penicillin (Rash)  sulfa drugs (Rash)    Intolerances            Vital Signs Last 24 Hrs  T(C): 36.6 (01 Sep 2017 08:45), Max: 37 (31 Aug 2017 15:44)  T(F): 97.9 (01 Sep 2017 08:45), Max: 98.6 (31 Aug 2017 15:44)  HR: 67 (01 Sep 2017 08:45) (58 - 79)  BP: 127/78 (01 Sep 2017 08:45) (114/70 - 148/77)  BP(mean): --  RR: 16 (01 Sep 2017 08:45) (16 - 23)  SpO2: 98% (01 Sep 2017 08:45) (96% - 99%)      REVIEW OF SYSTEMS:  Constitutional: No fever, chills, fatigue, weakness  Eyes: no eye pain, visual disturbances, or discharge  ENT:  No difficulty hearing, tinnitus, vertigo; No sinus or throat pain  Neck: No pain or stiffness  Respiratory: No cough, dyspnea, wheezing   Cardiovascular: No chest pain, palpitations,   Gastrointestinal: No abdominal or epigastric pain. No nausea, vomiting  No diarrhea or constipation.   Genitourinary: No dysuria, frequency, hematuria or incontinence  Neurological: No headaches, lightheadedness, vertigo, numbness or tremors  Psychiatric: No depression, anxiety, mood swings or difficulty sleeping  Musculoskeletal: No joint pain or swelling; No muscle, back or extremity pain  Skin: No itching, burning, rashes or lesions   Lymph Nodes: No enlarged glands  Endocrine: No heat or cold intolerance; No hair loss   Allergy and Immunologic: No hives or eczema    On Neurological Examination:    Mental Status - Patient is alert, awake, oriented X3.       Follow simple commands  Follow complex commands  Does not follow commands    Speech -   Fluent                       Cranial Nerves - Pupils 3 mm equal and reactive to light,   extraocular eye movements intact.   smile symmetric  intact bilateral NLF    Motor Exam -   Right upper 5/5  Left upper 5/5  Right lower 5/5  Left lower  5/5    intact pluses in left arm and hand   no coldness  good cap refill left hand     Muscle tone - is normal all over.  No asymmetry is seen.      Sensory    Bilateral intact to light touch    GENERAL Exam: Nontoxic , No Acute Distress   	  HEENT:  normocephalic, atraumatic  		  LUNGS: Clear bilaterally    	  HEART: Normal S1S2   No murmur RRR        	  GI/ ABDOMEN:  Soft  Non tender    EXTREMITIES:   No Edema  No Clubbing  No Cyanosis No Edema    MUSCULOSKELETAL: Normal Range of Motion     SKIN: Normal  No Ecchymosis               LABS:  CBC Full  -  ( 01 Sep 2017 06:04 )  WBC Count : 7.4 K/uL  Hemoglobin : 13.0 g/dL  Hematocrit : 39.7 %  Platelet Count - Automated : 206 K/uL  Mean Cell Volume : 91.4 fl  Mean Cell Hemoglobin : 29.9 pg  Mean Cell Hemoglobin Concentration : 32.7 gm/dL  Auto Neutrophil # : x  Auto Lymphocyte # : x  Auto Monocyte # : x  Auto Eosinophil # : x  Auto Basophil # : x  Auto Neutrophil % : x  Auto Lymphocyte % : x  Auto Monocyte % : x  Auto Eosinophil % : x  Auto Basophil % : x      09-01    144  |  107  |  15  ----------------------------<  64<L>  4.6   |  29  |  0.87    Ca    9.1      01 Sep 2017 06:04    TPro  7.4  /  Alb  3.9  /  TBili  0.4  /  DBili  x   /  AST  17  /  ALT  31  /  AlkPhos  83  08-30    Hemoglobin A1C:     LIVER FUNCTIONS - ( 30 Aug 2017 17:15 )  Alb: 3.9 g/dL / Pro: 7.4 g/dL / ALK PHOS: 83 U/L / ALT: 31 U/L / AST: 17 U/L / GGT: x           Vitamin B12   PT/INR - ( 30 Aug 2017 17:15 )   PT: 10.3 sec;   INR: 0.95 ratio         PTT - ( 30 Aug 2017 17:15 )  PTT:29.2 sec      RADIOLOGY      ANALYSIS AND PLAN:  This is a 62-year-old with episode of left arm spasms, numbness, and discoloration.    1.	For left arm abnormalities, paresthesias, questionable this could be vascular problem, questionable this could be subclinical seizure event vs TIA.  I spoke with radiologist, Dr. Suarez yesterdary.  It appears that the patient did have CTA of the chest and during that they put the arms up in the air, as per him when he reviewed it, it appears that the subclavian artery is open.  He sees no signs of stenosis or anything to suggest sub-thoracic outlet syndrome.  2.	Other differentials could be subclinical seizure events versus possible transient ischemic attack.  3.	We will discontinue aspirin, convert the patient over to Plavix.  4.	Continue the patient on cholesterol medication.  5.	We will check the patient's blood pressure on both arms with provocative maneuver --- there was no alternation in the patient's blood pressure.  6.	Unfortunately, I do not have an EEG technician this week.  The patient could have EEG done as an outpatient.  7.	I would recommend no driving, no heights, no ladder.  8.	I spoke with the patient and son at bedside yesterday .  Son is apparently an ER physician at [facility] to have follow up with outside neurology   9.	Ultrasound normal   10.	We will continue to follow.  11.	neurology wise stable for discharge planning     Thank you for the courtesy of consultation.      Physical therapy evaluation.  OOB to chair/ambulation with assistance only.    30 minutes spent on total encounter; more than 50% of the visit was spent counseling and/or coordinating care by the attending physician.

## 2017-09-05 DIAGNOSIS — R20.2 PARESTHESIA OF SKIN: ICD-10-CM

## 2017-09-05 DIAGNOSIS — Z79.4 LONG TERM (CURRENT) USE OF INSULIN: ICD-10-CM

## 2017-09-05 DIAGNOSIS — F41.9 ANXIETY DISORDER, UNSPECIFIED: ICD-10-CM

## 2017-09-05 DIAGNOSIS — E78.00 PURE HYPERCHOLESTEROLEMIA, UNSPECIFIED: ICD-10-CM

## 2017-09-05 DIAGNOSIS — E10.43 TYPE 1 DIABETES MELLITUS WITH DIABETIC AUTONOMIC (POLY)NEUROPATHY: ICD-10-CM

## 2017-09-05 DIAGNOSIS — K31.84 GASTROPARESIS: ICD-10-CM

## 2017-09-05 DIAGNOSIS — N40.0 BENIGN PROSTATIC HYPERPLASIA WITHOUT LOWER URINARY TRACT SYMPTOMS: ICD-10-CM

## 2017-09-05 DIAGNOSIS — R20.0 ANESTHESIA OF SKIN: ICD-10-CM

## 2017-09-05 DIAGNOSIS — Z79.82 LONG TERM (CURRENT) USE OF ASPIRIN: ICD-10-CM

## 2017-09-05 DIAGNOSIS — Z87.891 PERSONAL HISTORY OF NICOTINE DEPENDENCE: ICD-10-CM

## 2017-09-05 DIAGNOSIS — Z79.84 LONG TERM (CURRENT) USE OF ORAL HYPOGLYCEMIC DRUGS: ICD-10-CM

## 2018-01-11 ENCOUNTER — APPOINTMENT (OUTPATIENT)
Dept: UROLOGY | Facility: CLINIC | Age: 63
End: 2018-01-11
Payer: COMMERCIAL

## 2018-01-11 VITALS
RESPIRATION RATE: 16 BRPM | WEIGHT: 185 LBS | HEIGHT: 71 IN | TEMPERATURE: 98.2 F | SYSTOLIC BLOOD PRESSURE: 120 MMHG | BODY MASS INDEX: 25.9 KG/M2 | OXYGEN SATURATION: 98 % | HEART RATE: 84 BPM | DIASTOLIC BLOOD PRESSURE: 74 MMHG

## 2018-01-11 PROCEDURE — 99214 OFFICE O/P EST MOD 30 MIN: CPT

## 2018-01-12 LAB — PSA SERPL-MCNC: 1.71 NG/ML

## 2018-01-26 ENCOUNTER — FORM ENCOUNTER (OUTPATIENT)
Age: 63
End: 2018-01-26

## 2018-01-27 ENCOUNTER — OUTPATIENT (OUTPATIENT)
Dept: OUTPATIENT SERVICES | Facility: HOSPITAL | Age: 63
LOS: 1 days | End: 2018-01-27
Payer: COMMERCIAL

## 2018-01-27 ENCOUNTER — APPOINTMENT (OUTPATIENT)
Dept: ULTRASOUND IMAGING | Facility: CLINIC | Age: 63
End: 2018-01-27

## 2018-01-27 DIAGNOSIS — Z87.442 PERSONAL HISTORY OF URINARY CALCULI: ICD-10-CM

## 2018-01-27 DIAGNOSIS — Z90.49 ACQUIRED ABSENCE OF OTHER SPECIFIED PARTS OF DIGESTIVE TRACT: Chronic | ICD-10-CM

## 2018-01-27 PROCEDURE — 76770 US EXAM ABDO BACK WALL COMP: CPT | Mod: 26

## 2018-01-27 PROCEDURE — 76770 US EXAM ABDO BACK WALL COMP: CPT

## 2018-02-26 ENCOUNTER — RX RENEWAL (OUTPATIENT)
Age: 63
End: 2018-02-26

## 2018-03-28 ENCOUNTER — TRANSCRIPTION ENCOUNTER (OUTPATIENT)
Age: 63
End: 2018-03-28

## 2018-03-30 ENCOUNTER — RX RENEWAL (OUTPATIENT)
Age: 63
End: 2018-03-30

## 2018-04-05 ENCOUNTER — APPOINTMENT (OUTPATIENT)
Dept: GASTROENTEROLOGY | Facility: CLINIC | Age: 63
End: 2018-04-05
Payer: COMMERCIAL

## 2018-04-05 VITALS
DIASTOLIC BLOOD PRESSURE: 58 MMHG | HEIGHT: 71 IN | HEART RATE: 68 BPM | SYSTOLIC BLOOD PRESSURE: 107 MMHG | BODY MASS INDEX: 25.34 KG/M2 | WEIGHT: 181 LBS

## 2018-04-05 DIAGNOSIS — K26.3 ACUTE DUODENAL ULCER W/OUT HEMORRHAGE OR PERFORATION: ICD-10-CM

## 2018-04-05 PROCEDURE — 82274 ASSAY TEST FOR BLOOD FECAL: CPT | Mod: QW

## 2018-04-05 PROCEDURE — 99214 OFFICE O/P EST MOD 30 MIN: CPT

## 2018-05-08 ENCOUNTER — RX RENEWAL (OUTPATIENT)
Age: 63
End: 2018-05-08

## 2018-07-23 PROBLEM — K31.84 GASTROPARESIS: Chronic | Status: ACTIVE | Noted: 2017-08-30

## 2018-07-23 PROBLEM — N40.0 BENIGN PROSTATIC HYPERPLASIA WITHOUT LOWER URINARY TRACT SYMPTOMS: Chronic | Status: ACTIVE | Noted: 2017-08-30

## 2018-08-21 ENCOUNTER — RX RENEWAL (OUTPATIENT)
Age: 63
End: 2018-08-21

## 2018-09-25 ENCOUNTER — APPOINTMENT (OUTPATIENT)
Dept: GASTROENTEROLOGY | Facility: CLINIC | Age: 63
End: 2018-09-25
Payer: MEDICARE

## 2018-09-25 VITALS
BODY MASS INDEX: 25.9 KG/M2 | WEIGHT: 185 LBS | HEIGHT: 71 IN | HEART RATE: 58 BPM | SYSTOLIC BLOOD PRESSURE: 121 MMHG | DIASTOLIC BLOOD PRESSURE: 69 MMHG

## 2018-09-25 DIAGNOSIS — E11.9 TYPE 2 DIABETES MELLITUS W/OUT COMPLICATIONS: ICD-10-CM

## 2018-09-25 PROCEDURE — 99214 OFFICE O/P EST MOD 30 MIN: CPT

## 2018-09-25 PROCEDURE — 82274 ASSAY TEST FOR BLOOD FECAL: CPT | Mod: QW

## 2018-09-25 RX ORDER — AZITHROMYCIN 250 MG/1
250 TABLET, FILM COATED ORAL
Qty: 6 | Refills: 0 | Status: DISCONTINUED | COMMUNITY
Start: 2018-07-12

## 2018-09-25 RX ORDER — SITAGLIPTIN 100 MG/1
100 TABLET, FILM COATED ORAL
Qty: 90 | Refills: 0 | Status: ACTIVE | COMMUNITY
Start: 2018-05-03

## 2018-09-25 RX ORDER — ATORVASTATIN CALCIUM 40 MG/1
40 TABLET, FILM COATED ORAL
Qty: 90 | Refills: 0 | Status: ACTIVE | COMMUNITY
Start: 2018-09-10

## 2018-09-25 RX ORDER — CLOPIDOGREL BISULFATE 75 MG/1
75 TABLET, FILM COATED ORAL
Qty: 90 | Refills: 0 | Status: ACTIVE | COMMUNITY
Start: 2018-03-13

## 2018-10-02 ENCOUNTER — RX RENEWAL (OUTPATIENT)
Age: 63
End: 2018-10-02

## 2018-10-07 ENCOUNTER — FORM ENCOUNTER (OUTPATIENT)
Age: 63
End: 2018-10-07

## 2018-10-08 ENCOUNTER — OUTPATIENT (OUTPATIENT)
Dept: OUTPATIENT SERVICES | Facility: HOSPITAL | Age: 63
LOS: 1 days | End: 2018-10-08
Payer: MEDICARE

## 2018-10-08 ENCOUNTER — APPOINTMENT (OUTPATIENT)
Age: 63
End: 2018-10-08

## 2018-10-08 DIAGNOSIS — Z90.49 ACQUIRED ABSENCE OF OTHER SPECIFIED PARTS OF DIGESTIVE TRACT: Chronic | ICD-10-CM

## 2018-10-08 DIAGNOSIS — Z00.8 ENCOUNTER FOR OTHER GENERAL EXAMINATION: ICD-10-CM

## 2018-10-08 PROCEDURE — 76700 US EXAM ABDOM COMPLETE: CPT | Mod: 26

## 2018-10-08 PROCEDURE — 76700 US EXAM ABDOM COMPLETE: CPT

## 2018-10-23 ENCOUNTER — RX RENEWAL (OUTPATIENT)
Age: 63
End: 2018-10-23

## 2019-01-23 ENCOUNTER — APPOINTMENT (OUTPATIENT)
Dept: UROLOGY | Facility: CLINIC | Age: 64
End: 2019-01-23
Payer: MEDICARE

## 2019-01-23 VITALS
BODY MASS INDEX: 25.9 KG/M2 | RESPIRATION RATE: 14 BRPM | SYSTOLIC BLOOD PRESSURE: 116 MMHG | TEMPERATURE: 97.8 F | HEIGHT: 71 IN | DIASTOLIC BLOOD PRESSURE: 62 MMHG | OXYGEN SATURATION: 98 % | WEIGHT: 185 LBS | HEART RATE: 74 BPM

## 2019-01-23 PROCEDURE — 99213 OFFICE O/P EST LOW 20 MIN: CPT

## 2019-01-23 NOTE — PHYSICAL EXAM
[General Appearance - Well Developed] : well developed [General Appearance - Well Nourished] : well nourished [Normal Appearance] : normal appearance [Well Groomed] : well groomed [General Appearance - In No Acute Distress] : no acute distress [Abdomen Soft] : soft [Abdomen Tenderness] : non-tender [Costovertebral Angle Tenderness] : no ~M costovertebral angle tenderness [Urethral Meatus] : meatus normal [Penis Abnormality] : normal circumcised penis [Urinary Bladder Findings] : the bladder was normal on palpation [Scrotum] : the scrotum was normal [Epididymis] : the epididymides were normal [Testes Tenderness] : no tenderness of the testes [Testes Mass (___cm)] : there were no testicular masses [Prostate Tenderness] : the prostate was not tender [No Prostate Nodules] : no prostate nodules [Prostate Enlarged] : was enlarged [FreeTextEntry1] : EVELYN done on 1/2019 [] : no respiratory distress [Oriented To Time, Place, And Person] : oriented to person, place, and time

## 2019-01-23 NOTE — ASSESSMENT
[FreeTextEntry1] : According to patient, there is no change in review of systems, past medical, surgical and family histories, except what is mentioned above. PSA level will be checked and renal ultrasound will be repeated. He seems to be voiding relatively well. He can followup in 6 months or in one year. Continue Flomax.\par \par David Tran MD, FACS\par [facility]\par  of Urology\par \par [address], [address]\[place] [address]\par \par [address], [address]\[place] [address]\par \par Tel: [phone]\par Fax: [phone]

## 2019-01-23 NOTE — HISTORY OF PRESENT ILLNESS
[FreeTextEntry1] : He is a 63-year-old man who is seen today in follow-up for urinary symptoms and kidney stone. He is on Flomax. Nocturia is 2 or 3 times. Residual urine today was minimal. He does not have any flank pain. There is no hematuria or dysuria. Ultrasound in January 2018 showed a 5 mm left lower pole kidney stone. PSA level was 1.7. Residual urine today was less than 50 cc.\par Previous note: As a teenager, he may have had dilation of the urethra or meatotomy. He has diabetes which is controlled. CT scan in 1/2016 showed left renal stone 5 mm and enlarged prostate.

## 2019-01-23 NOTE — LETTER BODY
[Dear  ___] : Dear  [unfilled], [Attached please find my note.] : Attached please find my note. [Thank you very much for allowing me to participate in the care of this patient. If you have any questions, please do not hesitate to contact me] : Thank you very much for allowing me to participate in the care of this patient. If you have any questions, please do not hesitate to contact me. [FreeTextEntry1] : [address] [place] [address] \par  [phone]\par

## 2019-01-24 LAB — PSA SERPL-MCNC: 2.23 NG/ML

## 2019-02-11 ENCOUNTER — FORM ENCOUNTER (OUTPATIENT)
Age: 64
End: 2019-02-11

## 2019-02-12 ENCOUNTER — MESSAGE (OUTPATIENT)
Age: 64
End: 2019-02-12

## 2019-02-12 ENCOUNTER — APPOINTMENT (OUTPATIENT)
Dept: ULTRASOUND IMAGING | Facility: CLINIC | Age: 64
End: 2019-02-12
Payer: MEDICARE

## 2019-02-12 ENCOUNTER — OUTPATIENT (OUTPATIENT)
Dept: OUTPATIENT SERVICES | Facility: HOSPITAL | Age: 64
LOS: 1 days | End: 2019-02-12
Payer: MEDICARE

## 2019-02-12 DIAGNOSIS — N20.0 CALCULUS OF KIDNEY: ICD-10-CM

## 2019-02-12 DIAGNOSIS — Z90.49 ACQUIRED ABSENCE OF OTHER SPECIFIED PARTS OF DIGESTIVE TRACT: Chronic | ICD-10-CM

## 2019-02-12 PROCEDURE — 76770 US EXAM ABDO BACK WALL COMP: CPT | Mod: 26

## 2019-02-12 PROCEDURE — 76770 US EXAM ABDO BACK WALL COMP: CPT

## 2019-08-06 ENCOUNTER — TRANSCRIPTION ENCOUNTER (OUTPATIENT)
Age: 64
End: 2019-08-06

## 2019-09-12 ENCOUNTER — RX RENEWAL (OUTPATIENT)
Age: 64
End: 2019-09-12

## 2019-09-12 ENCOUNTER — APPOINTMENT (OUTPATIENT)
Dept: GASTROENTEROLOGY | Facility: CLINIC | Age: 64
End: 2019-09-12
Payer: MEDICARE

## 2019-09-12 VITALS
DIASTOLIC BLOOD PRESSURE: 68 MMHG | HEIGHT: 71 IN | WEIGHT: 179 LBS | SYSTOLIC BLOOD PRESSURE: 125 MMHG | HEART RATE: 59 BPM | BODY MASS INDEX: 25.06 KG/M2

## 2019-09-12 DIAGNOSIS — R11.0 NAUSEA: ICD-10-CM

## 2019-09-12 DIAGNOSIS — K40.90 UNILATERAL INGUINAL HERNIA, W/OUT OBSTRUCTION OR GANGRENE, NOT SPECIFIED AS RECURRENT: ICD-10-CM

## 2019-09-12 PROCEDURE — 82274 ASSAY TEST FOR BLOOD FECAL: CPT | Mod: QW

## 2019-09-12 PROCEDURE — 99214 OFFICE O/P EST MOD 30 MIN: CPT

## 2019-09-12 RX ORDER — ALBUTEROL SULFATE 90 UG/1
108 (90 BASE) AEROSOL, METERED RESPIRATORY (INHALATION)
Qty: 8 | Refills: 0 | Status: DISCONTINUED | COMMUNITY
Start: 2018-07-12 | End: 2019-09-12

## 2019-09-12 NOTE — PHYSICAL EXAM
[General Appearance - In No Acute Distress] : in no acute distress [General Appearance - Well Nourished] : well nourished [General Appearance - Alert] : alert [General Appearance - Well Developed] : well developed [General Appearance - Well-Appearing] : healthy appearing [Outer Ear] : the ears and nose were normal in appearance [Sclera] : the sclera and conjunctiva were normal [Oropharynx] : the oropharynx was normal [Neck Appearance] : the appearance of the neck was normal [Thyroid Diffuse Enlargement] : the thyroid was not enlarged [Neck Cervical Mass (___cm)] : no neck mass was observed [Jugular Venous Distention Increased] : there was no jugular-venous distention [Thyroid Nodule] : there were no palpable thyroid nodules [Auscultation Breath Sounds / Voice Sounds] : lungs were clear to auscultation bilaterally [Heart Sounds] : normal S1 and S2 [Heart Rate And Rhythm] : heart rate was normal and rhythm regular [Murmurs] : no murmurs [Heart Sounds Pericardial Friction Rub] : no pericardial rub [Heart Sounds Gallop] : no gallops [Full Pulse] : the pedal pulses are present [Edema] : there was no peripheral edema [Abdomen Soft] : soft [Bowel Sounds] : normal bowel sounds [Abdomen Tenderness] : non-tender [Abdomen Mass (___ Cm)] : no abdominal mass palpated [Normal Sphincter Tone] : normal sphincter tone [No Rectal Mass] : no rectal mass [Prostate Size___ (Scale 0-4)] : prostate size was [unfilled] on a scale of 0-4 [Cervical Lymph Nodes Enlarged Anterior Bilaterally] : anterior cervical [Cervical Lymph Nodes Enlarged Posterior Bilaterally] : posterior cervical [Supraclavicular Lymph Nodes Enlarged Bilaterally] : supraclavicular [Axillary Lymph Nodes Enlarged Bilaterally] : axillary [Femoral Lymph Nodes Enlarged Bilaterally] : femoral [Inguinal Lymph Nodes Enlarged Bilaterally] : inguinal [No CVA Tenderness] : no ~M costovertebral angle tenderness [Abnormal Walk] : normal gait [No Spinal Tenderness] : no spinal tenderness [Musculoskeletal - Swelling] : no joint swelling seen [Nail Clubbing] : no clubbing  or cyanosis of the fingernails [Skin Color & Pigmentation] : normal skin color and pigmentation [Motor Tone] : muscle strength and tone were normal [] : no rash [Skin Turgor] : normal skin turgor [Oriented To Time, Place, And Person] : oriented to person, place, and time [Impaired Insight] : insight and judgment were intact [Affect] : the affect was normal [External Hemorrhoid] : no external hemorrhoids [Internal Hemorrhoid] : no internal hemorrhoids [Occult Blood Positive] : stool was negative for occult blood [Prostate Tenderness] : was not tender [FreeTextEntry1] : onychomycoses

## 2019-09-12 NOTE — CONSULT LETTER
[Dear  ___] : Dear  [unfilled], [Courtesy Letter:] : I had the pleasure of seeing your patient, [unfilled], in my office today. [Please see my note below.] : Please see my note below. [Consult Closing:] : Thank you very much for allowing me to participate in the care of this patient.  If you have any questions, please do not hesitate to contact me. [Sincerely,] : Sincerely, [FreeTextEntry3] : Adonis Garcia M.D.\par

## 2019-09-12 NOTE — REASON FOR VISIT
[Follow-Up: _____] : a [unfilled] follow-up visit [FreeTextEntry1] : follow up history of diverticulitis of the colon

## 2019-09-12 NOTE — ASSESSMENT
[FreeTextEntry1] : 1. Occasional epigastric pain, nausea statistically likely from gastroparesis; duodenal ulcers, reflux esophagitis at EGD May 2016.\par 2. History of "colitis" and diverticulitis; diverticulosis, hemorrhoids at colonoscopy May 2016. Possible small left inguinal hernia on today's exam.\par 3. Mild anemia--unclear if from chronic disease and/or iron deficiency.\par 4. Type 2 diabetes mellitus.\par 5. Hypercholesterolemia.\par 6. Overweight.\par 7. BPH; left kidney stone.\par 8. Status post left arm lipoma excision.\par 9. Allergic to penicillin, sulfa.\par \par Plan:\par 1. Latest labs reviewed. Check iron studies with next bloodwrok (Rx slip given). \par 2. Continue Pantoprazole PPI daily, and Ondansetron as needed. \par 3. If iron deficient, he will return here, and then I would consider repeat panendoscopy (and perhaps even capsule endoscopy to follow).\par 4. Otherwise, return here in 1 year.\par 5. If left inguinal hernia becomes much more prominent or symptomatic, then elective surgery should be considered.

## 2019-12-09 ENCOUNTER — RX RENEWAL (OUTPATIENT)
Age: 64
End: 2019-12-09

## 2019-12-26 ENCOUNTER — RX RENEWAL (OUTPATIENT)
Age: 64
End: 2019-12-26

## 2020-01-22 ENCOUNTER — APPOINTMENT (OUTPATIENT)
Dept: UROLOGY | Facility: CLINIC | Age: 65
End: 2020-01-22
Payer: MEDICARE

## 2020-01-22 VITALS
WEIGHT: 179 LBS | HEIGHT: 71 IN | SYSTOLIC BLOOD PRESSURE: 131 MMHG | HEART RATE: 65 BPM | TEMPERATURE: 97.4 F | DIASTOLIC BLOOD PRESSURE: 74 MMHG | BODY MASS INDEX: 25.06 KG/M2 | OXYGEN SATURATION: 97 % | RESPIRATION RATE: 13 BRPM

## 2020-01-22 PROCEDURE — 99213 OFFICE O/P EST LOW 20 MIN: CPT

## 2020-01-22 NOTE — HISTORY OF PRESENT ILLNESS
[FreeTextEntry1] : He is a 64-year-old man who is seen today in follow-up for urinary symptoms and kidney stone. Nocturia is usually twice. There is no hematuria or dysuria. He does not complain of flank pain. He is on Flomax. PSA level was 2.3 in January 2019. Ultrasound in February 2019 showed that the left lower pole kidney stone was larger, 9 mm and there was a parapelvic renal cyst. Today, maximum flow was 15.8 and average flow 8 mL per second. He voided 200 cc and residual urine was 50 cc.\par Previous note: As a teenager, he may have had dilation of the urethra or meatotomy. He has diabetes which is controlled. CT scan in 1/2016 showed left renal stone 5 mm and enlarged prostate.

## 2020-01-22 NOTE — ASSESSMENT
[FreeTextEntry1] : His examination is unchanged. Residual urine is minimal. Continue Flomax. He will undergo followup renal ultrasound. We have discussed different treatment options for kidney stones, ESWL versus ureteroscopy or observation. He will get back to me after ultrasound is done.\par \par David Tran MD, FACS\par [facility]\par  of Urology\par \par [address], [address]\[place] [address]\par \par [address], [address]\[place] [address]\par \par Tel: [phone]\par Fax: [phone]

## 2020-01-22 NOTE — LETTER BODY
[Dear  ___] : Dear  [unfilled], [Attached please find my note.] : Attached please find my note. [Thank you very much for allowing me to participate in the care of this patient. If you have any questions, please do not hesitate to contact me] : Thank you very much for allowing me to participate in the care of this patient. If you have any questions, please do not hesitate to contact me. [FreeTextEntry1] : [address] [place] [address] \par  [phone]\par

## 2020-01-23 LAB — PSA SERPL-MCNC: 1.91 NG/ML

## 2020-01-26 ENCOUNTER — RX RENEWAL (OUTPATIENT)
Age: 65
End: 2020-01-26

## 2020-02-03 ENCOUNTER — FORM ENCOUNTER (OUTPATIENT)
Age: 65
End: 2020-02-03

## 2020-02-04 ENCOUNTER — OUTPATIENT (OUTPATIENT)
Dept: OUTPATIENT SERVICES | Facility: HOSPITAL | Age: 65
LOS: 1 days | End: 2020-02-04
Payer: MEDICARE

## 2020-02-04 ENCOUNTER — APPOINTMENT (OUTPATIENT)
Dept: ULTRASOUND IMAGING | Facility: CLINIC | Age: 65
End: 2020-02-04
Payer: MEDICARE

## 2020-02-04 DIAGNOSIS — N20.0 CALCULUS OF KIDNEY: ICD-10-CM

## 2020-02-04 DIAGNOSIS — Z90.49 ACQUIRED ABSENCE OF OTHER SPECIFIED PARTS OF DIGESTIVE TRACT: Chronic | ICD-10-CM

## 2020-02-04 PROCEDURE — 76770 US EXAM ABDO BACK WALL COMP: CPT | Mod: 26

## 2020-02-04 PROCEDURE — 76770 US EXAM ABDO BACK WALL COMP: CPT

## 2020-02-11 ENCOUNTER — OUTPATIENT (OUTPATIENT)
Dept: OUTPATIENT SERVICES | Facility: HOSPITAL | Age: 65
LOS: 1 days | End: 2020-02-11
Payer: MEDICARE

## 2020-02-11 ENCOUNTER — APPOINTMENT (OUTPATIENT)
Dept: RADIOLOGY | Facility: CLINIC | Age: 65
End: 2020-02-11
Payer: MEDICARE

## 2020-02-11 DIAGNOSIS — Z90.49 ACQUIRED ABSENCE OF OTHER SPECIFIED PARTS OF DIGESTIVE TRACT: Chronic | ICD-10-CM

## 2020-02-11 DIAGNOSIS — Z00.8 ENCOUNTER FOR OTHER GENERAL EXAMINATION: ICD-10-CM

## 2020-02-11 PROCEDURE — 71046 X-RAY EXAM CHEST 2 VIEWS: CPT

## 2020-02-11 PROCEDURE — 71046 X-RAY EXAM CHEST 2 VIEWS: CPT | Mod: 26

## 2020-02-15 ENCOUNTER — TRANSCRIPTION ENCOUNTER (OUTPATIENT)
Age: 65
End: 2020-02-15

## 2020-03-13 ENCOUNTER — TRANSCRIPTION ENCOUNTER (OUTPATIENT)
Age: 65
End: 2020-03-13

## 2020-06-25 NOTE — PATIENT PROFILE ADULT. - PRO ANTICIPATED DISCH DISP
Pre-Endoscopy History and Physical     Ilene Gaviria MRN# [id number]   YOB: 1963 Age: 56 year old     Date of Procedure: 6/25/2020  Primary care provider: Trevor Rizo  Type of Endoscopy: colonoscopy  Reason for Procedure: surveillance  Type of Anesthesia Anticipated: Moderate Sedation    HPI:    Ilene is a 56 year old female who will be undergoing the above procedure.      A history and physical has been performed. The patient's medications and allergies have been reviewed. The risks and benefits of the procedure including the risk of bleeding, perforation, and missed lesions as well as the sedation options and risks were discussed with the patient.  All questions were answered and informed consent was obtained.      Allergies   Allergen Reactions     No Known Allergies         No current facility-administered medications for this encounter.        No medications prior to admission.       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)        Past Medical History:   Diagnosis Date     Cancer (H)     rectal cancer        Past Surgical History:   Procedure Laterality Date     DAVINCI COLECTOMY N/A 8/7/2018    Procedure: DAVINCI XI COLECTOMY;  DAVINCI ABDOMINAL PERINEAL RESECTION WITH PERMANENT COLOSTOMY ( PARI ) LEFT GLUTEAL FLAP TO RECTAL DEFECT ( JERE ) ;  Surgeon: Jonny Finney MD;  Location:  OR     GENITOURINARY SURGERY      bladder sling 12/18     GRAFT FLAP GLUTEUS TO PERINEUM N/A 8/7/2018    Procedure: GRAFT FLAP GLUTEUS TO PERINEUM;;  Surgeon: Conchita Ramos MD;  Location:  OR     GYN SURGERY      c section X3, tubal      SIGMOIDOSCOPY FLEXIBLE N/A 7/9/2018    Procedure: SIGMOIDOSCOPY FLEXIBLE;  FLEXIBILE SIGMOIDOSCOPY;  Surgeon: Jonny Finney MD;  Location:  GI       Social History     Tobacco Use     Smoking status: Never Smoker     Smokeless tobacco: Never Used   Substance  "Use Topics     Alcohol use: Yes     Comment: rare       History reviewed. No pertinent family history.      PHYSICAL EXAM:   Temp 98  F (36.7  C)   Ht 1.727 m (5' 8\")   Wt 93 kg (205 lb)   BMI 31.17 kg/m   Estimated body mass index is 31.17 kg/m  as calculated from the following:    Height as of this encounter: 1.727 m (5' 8\").    Weight as of this encounter: 93 kg (205 lb).   Mental status - alert and oriented  RESP: lungs clear  CV: RRR  AIRWAY EXAM: Mallampatti Class II (visualization of the soft palate, fauces, and uvula)    IMPRESSION   ASA Class 2 - Mild systemic disease      Signed Electronically by: Jonny Finney MD  June 25, 2020    Colorectal Surgery  [phone] (office)  [phone] (pager)  [email or web address]          " unsure/home

## 2020-06-29 ENCOUNTER — RX RENEWAL (OUTPATIENT)
Age: 65
End: 2020-06-29

## 2020-07-21 ENCOUNTER — RX RENEWAL (OUTPATIENT)
Age: 65
End: 2020-07-21

## 2020-08-27 ENCOUNTER — RX RENEWAL (OUTPATIENT)
Age: 65
End: 2020-08-27

## 2020-10-17 ENCOUNTER — TRANSCRIPTION ENCOUNTER (OUTPATIENT)
Age: 65
End: 2020-10-17

## 2020-11-23 ENCOUNTER — RX RENEWAL (OUTPATIENT)
Age: 65
End: 2020-11-23

## 2021-01-07 ENCOUNTER — RX RENEWAL (OUTPATIENT)
Age: 66
End: 2021-01-07

## 2021-01-20 ENCOUNTER — APPOINTMENT (OUTPATIENT)
Dept: UROLOGY | Facility: CLINIC | Age: 66
End: 2021-01-20
Payer: MEDICARE

## 2021-01-20 VITALS
SYSTOLIC BLOOD PRESSURE: 129 MMHG | BODY MASS INDEX: 24.5 KG/M2 | DIASTOLIC BLOOD PRESSURE: 72 MMHG | OXYGEN SATURATION: 92 % | HEART RATE: 104 BPM | WEIGHT: 175 LBS | RESPIRATION RATE: 15 BRPM | HEIGHT: 71 IN | TEMPERATURE: 97.5 F

## 2021-01-20 PROCEDURE — 99214 OFFICE O/P EST MOD 30 MIN: CPT

## 2021-01-20 RX ORDER — INSULIN GLARGINE 300 U/ML
300 INJECTION, SOLUTION SUBCUTANEOUS
Qty: 12 | Refills: 0 | Status: ACTIVE | COMMUNITY
Start: 2020-11-11

## 2021-01-20 RX ORDER — INSULIN GLARGINE 300 U/ML
300 INJECTION, SOLUTION SUBCUTANEOUS
Refills: 0 | Status: DISCONTINUED | COMMUNITY
End: 2021-01-20

## 2021-01-20 NOTE — LETTER BODY
[Dear  ___] : Dear  [unfilled], [Attached please find my note.] : Attached please find my note. [Thank you very much for allowing me to participate in the care of this patient. If you have any questions, please do not hesitate to contact me] : Thank you very much for allowing me to participate in the care of this patient. If you have any questions, please do not hesitate to contact me. [FreeTextEntry1] : [address] [place] [address] \par  [phone]\par

## 2021-01-20 NOTE — ASSESSMENT
[FreeTextEntry1] : Examination is unchanged.  It seems that urination worsened since he started a new diabetic medication and also hemoglobin A1c was 8.  Regardless, he is not bothered by it and he will continue with Flomax.  He will undergo follow-up renal ultrasound.  For now, he has decided on observation since he is asymptomatic.  We have discussed different treatment options for kidney stone including shockwave lithotripsy or ureteroscopy and laser lithotripsy.  We will discuss the results of the ultrasound on the phone after it is performed.  PSA level will be sent today.\par \par David Tran MD, FACS\par [facility]\par  of Urology\par \par [address], [address]\[place] [address]\par \par [address], [address]\[place] [address]\par \par Tel: [phone]\par Fax: [phone]

## 2021-01-20 NOTE — PHYSICAL EXAM
[General Appearance - Well Developed] : well developed [General Appearance - Well Nourished] : well nourished [Normal Appearance] : normal appearance [Well Groomed] : well groomed [General Appearance - In No Acute Distress] : no acute distress [Abdomen Soft] : soft [Abdomen Tenderness] : non-tender [Costovertebral Angle Tenderness] : no ~M costovertebral angle tenderness [Urethral Meatus] : meatus normal [Penis Abnormality] : normal circumcised penis [Urinary Bladder Findings] : the bladder was normal on palpation [Scrotum] : the scrotum was normal [Epididymis] : the epididymides were normal [Testes Tenderness] : no tenderness of the testes [Testes Mass (___cm)] : there were no testicular masses [Prostate Tenderness] : the prostate was not tender [No Prostate Nodules] : no prostate nodules [Prostate Enlarged] : was enlarged [FreeTextEntry1] : EVELYN done on 1/2021.  [] : no respiratory distress [Respiration, Rhythm And Depth] : normal respiratory rhythm and effort [Exaggerated Use Of Accessory Muscles For Inspiration] : no accessory muscle use [Oriented To Time, Place, And Person] : oriented to person, place, and time [Affect] : the affect was normal [Mood] : the mood was normal [Not Anxious] : not anxious

## 2021-01-20 NOTE — REVIEW OF SYSTEMS
[see HPI] : see HPI [Nocturia] : nocturia [Erectile Dysfunction] : no erectile dysfunction [Negative] : Gastrointestinal

## 2021-01-20 NOTE — HISTORY OF PRESENT ILLNESS
[FreeTextEntry1] : He is a 65-year-old man who is seen today in follow-up for urinary symptoms and kidney stone.  According to the patient, he was placed on new diabetic medication and nocturia is now 3-4 times.  Previously was usually 2 times on Flomax.  He falls back sleep and he is not very bothered by it.  He does not complain of erectile dysfunction.  There is no flank pain, dysuria or hematuria.  Ultrasound in February 2020 showed 1.1 cm left lower pole kidney stone and a small parapelvic renal cyst.  Prostate measured 63 g and the residual urine volume was minimal.  PSA level was 1.9 in January 2020.  Recent hemoglobin A1c was 8.  \par Ultrasound in February 2019 showed that the left lower pole kidney stone was 9 mm and there was a parapelvic renal cyst. Maximum flow was 15.8 and average flow 8 mL per second. He voided 200 cc and residual urine was 50 cc.\par Previous note: As a teenager, he may have had dilation of the urethra or meatotomy. CT scan in 1/2016 showed left renal stone 5 mm and enlarged prostate.

## 2021-01-21 ENCOUNTER — NON-APPOINTMENT (OUTPATIENT)
Age: 66
End: 2021-01-21

## 2021-01-21 LAB — PSA SERPL-MCNC: 2 NG/ML

## 2021-01-28 ENCOUNTER — APPOINTMENT (OUTPATIENT)
Dept: ULTRASOUND IMAGING | Facility: CLINIC | Age: 66
End: 2021-01-28
Payer: MEDICARE

## 2021-01-28 ENCOUNTER — OUTPATIENT (OUTPATIENT)
Dept: OUTPATIENT SERVICES | Facility: HOSPITAL | Age: 66
LOS: 1 days | End: 2021-01-28
Payer: MEDICARE

## 2021-01-28 DIAGNOSIS — Z90.49 ACQUIRED ABSENCE OF OTHER SPECIFIED PARTS OF DIGESTIVE TRACT: Chronic | ICD-10-CM

## 2021-01-28 DIAGNOSIS — N20.0 CALCULUS OF KIDNEY: ICD-10-CM

## 2021-01-28 PROCEDURE — 76770 US EXAM ABDO BACK WALL COMP: CPT

## 2021-01-28 PROCEDURE — 76770 US EXAM ABDO BACK WALL COMP: CPT | Mod: 26

## 2021-03-02 ENCOUNTER — RX RENEWAL (OUTPATIENT)
Age: 66
End: 2021-03-02

## 2021-04-01 ENCOUNTER — APPOINTMENT (OUTPATIENT)
Dept: ORTHOPEDIC SURGERY | Facility: CLINIC | Age: 66
End: 2021-04-01
Payer: MEDICARE

## 2021-04-01 VITALS
SYSTOLIC BLOOD PRESSURE: 138 MMHG | OXYGEN SATURATION: 98 % | WEIGHT: 175 LBS | DIASTOLIC BLOOD PRESSURE: 81 MMHG | HEIGHT: 71 IN | HEART RATE: 76 BPM | BODY MASS INDEX: 24.5 KG/M2

## 2021-04-01 PROCEDURE — 99203 OFFICE O/P NEW LOW 30 MIN: CPT

## 2021-04-01 PROCEDURE — 73130 X-RAY EXAM OF HAND: CPT | Mod: RT

## 2021-04-01 NOTE — DATA REVIEWED
[Imaging Present] : Present [de-identified] : X-rays today 3 views the right hand show no soft tissue shadows.  No calcifications.  No bony problems.

## 2021-04-01 NOTE — HISTORY OF PRESENT ILLNESS
[FreeTextEntry1] : 65-year-old gentleman has had a year of a right index finger mass.  This is over the ulnar volar portion of the proximal phalanx.  It is possible centimeter and a half.  It has minimal pain.  He thinks it has grown in size over the past year. [Stable] : stable

## 2021-04-01 NOTE — DISCUSSION/SUMMARY
[All Questions Answered] : Patient (and family) had all questions answered to an agreeable level of satisfaction [Interested in Proceeding] : Patient (and family) expressed understanding and interest in proceeding with the plan as outlined [de-identified] : I recommended that the patient get an MRI scan so we can see this a little bit better.  It could be related to the nerves could be related to giant cell tumor of tendon sheath or some other mass.  Regardless I would want to get an MRI to fully elucidate this and then we can plan for possible surgical resection.  Follow-up after MRI scan of right hand with and without contrast.\par \par \par If imaging was ordered, the patient was told to make an appointment to review findings right after all imaging is completed.\par \par We discussed risks, benefits and alternatives. Rationale of care was reviewed and all questions were answered. Patient (and family) had all questions answered to her degree of the level of satisfaction. Patient (and family) expressed understanding and interest in proceeding with the plan as outlined.\par \par \par \par \par This note was done with a voice recognition transcription software and any typos are related to this rather than medical error. Surgical risks reviewed. Patient (and family) had all questions answered to an agreeable level of satisfaction. Patient (and family) expressed understanding and interest in proceeding with the plan as outlined.  \par

## 2021-04-01 NOTE — PHYSICAL EXAM
[FreeTextEntry1] : On exam patient stands in good balance.  He is able to move around without any problems.  On his right hand he has a 1/2 cm mass over the volar ulnar side proximal phalanx.  It is mobile.  It does not feel attached to the tendons.  There is minimal Tinel's no thrills or bruits.  He has no epitrochlear or axillary lymphadenopathy.  He has full motion throughout. [General Appearance - Well-Appearing] : Well appearing [General Appearance - Well Nourished] : well nourished [Oriented To Time, Place, And Person] : Oriented to person, place, and time [Impaired Insight] : Insight and judgment were intact [Affect] : The affect was normal. [Mood] : the mood was normal [Sclera] : the sclera and conjunctiva were normal [Neck Cervical Mass (___cm)] : no neck mass was observed [Heart Rate And Rhythm] : heart rate was normal and rhythm regular [] : No respiratory distress [Abdomen Soft] : Soft [Normal Station and Gait] : gait and station were normal [Tenderness] : no tenderness [Swelling] : swelling [Masses] : masses [Skin Changes - Describe changes:] : No skin changes noted [Full UE ROM unless otherwise noted:] : Full range of motion unless otherwise noted. [UE Motor Strength Normal unless otherwise noted:] : 5/5 strength in bilateral upper extremities unless otherwise noted. [Normal] : Sensation intact to light touch.

## 2021-04-15 ENCOUNTER — OUTPATIENT (OUTPATIENT)
Dept: OUTPATIENT SERVICES | Facility: HOSPITAL | Age: 66
LOS: 1 days | End: 2021-04-15
Payer: MEDICARE

## 2021-04-15 ENCOUNTER — APPOINTMENT (OUTPATIENT)
Dept: MRI IMAGING | Facility: CLINIC | Age: 66
End: 2021-04-15
Payer: MEDICARE

## 2021-04-15 DIAGNOSIS — M25.849 OTHER SPECIFIED JOINT DISORDERS, UNSPECIFIED HAND: ICD-10-CM

## 2021-04-15 DIAGNOSIS — Z90.49 ACQUIRED ABSENCE OF OTHER SPECIFIED PARTS OF DIGESTIVE TRACT: Chronic | ICD-10-CM

## 2021-04-15 PROCEDURE — 73220 MRI UPPR EXTREMITY W/O&W/DYE: CPT

## 2021-04-15 PROCEDURE — G1004: CPT

## 2021-04-15 PROCEDURE — A9585: CPT

## 2021-04-15 PROCEDURE — 73220 MRI UPPR EXTREMITY W/O&W/DYE: CPT | Mod: 26,RT,ME

## 2021-04-26 ENCOUNTER — RX RENEWAL (OUTPATIENT)
Age: 66
End: 2021-04-26

## 2021-04-28 ENCOUNTER — APPOINTMENT (OUTPATIENT)
Dept: ORTHOPEDIC SURGERY | Facility: CLINIC | Age: 66
End: 2021-04-28
Payer: MEDICARE

## 2021-04-28 VITALS
DIASTOLIC BLOOD PRESSURE: 63 MMHG | WEIGHT: 168 LBS | BODY MASS INDEX: 23.52 KG/M2 | HEART RATE: 67 BPM | OXYGEN SATURATION: 97 % | HEIGHT: 71 IN | SYSTOLIC BLOOD PRESSURE: 144 MMHG

## 2021-04-28 PROCEDURE — 99214 OFFICE O/P EST MOD 30 MIN: CPT

## 2021-04-28 NOTE — HISTORY OF PRESENT ILLNESS
[Stable] : stable [2] : currently ~his/her~ pain is 2 out of 10 [Direct Pressure] : worsened by direct pressure [Joint Movement] : worsened by joint movement [FreeTextEntry1] : Patient is back to review his MRI and plan for possible treatment.  He has minimal change.  He still has the same amount of fullness and tightness as before.

## 2021-04-28 NOTE — DATA REVIEWED
[de-identified] : MRI scan from April 2, 2020 one of the right hand with without contrast shows:\par IMPRESSION:\par Rim enhancing T2 hyperintense cystic lesion in the volar subcutaneous soft tissues of the index finger. Differential includes ganglion cyst, or less likely, cystic schwannoma and perivascular myopericytoma. Consider aspiration or soft tissue sampling for further evaluation.\par \par \par

## 2021-04-28 NOTE — DISCUSSION/SUMMARY
[All Questions Answered] : Patient (and family) had all questions answered to an agreeable level of satisfaction [Interested in Proceeding] : Patient (and family) expressed understanding and interest in proceeding with the plan as outlined [de-identified] : I attempted to aspirate this today however nothing came out.  My recommendation is either watch or resect this.  It does not look like it is growing significantly however I would see this again in 2 to 3 months to make sure that it is not growing.  Alternatively we can do surgery if it is bothering him.  It does not seem to be giving him significant symptoms right now so he wants to hold off.  If at any point he changes his mind and wants to do surgery we discussed this is a small resection.\par \par If imaging was ordered, the patient was told to make an appointment to review findings right after all imaging is completed.\par \par We discussed risks, benefits and alternatives. Rationale of care was reviewed and all questions were answered. Patient (and family) had all questions answered to her degree of the level of satisfaction. Patient (and family) expressed understanding and interest in proceeding with the plan as outlined.\par \par \par \par \par This note was done with a voice recognition transcription software and any typos are related to this rather than medical error. Surgical risks reviewed. Patient (and family) had all questions answered to an agreeable level of satisfaction. Patient (and family) expressed understanding and interest in proceeding with the plan as outlined.  \par

## 2021-04-28 NOTE — PHYSICAL EXAM
[FreeTextEntry1] : Exam is as it was before.  He has a small mass along the volar ulnar side of the proximal phalanx of the index finger on the right hand.  It is minimally mobile and mildly tender.  I attempted to aspirate this today with an 18-gauge needle however nothing was able to be aspirated.  He did have mild pain but no Tinel's with it.  He is otherwise intact.  He still has the superficial skin lesion over the hypothenar ulnar border of his hand. [General Appearance - Well-Appearing] : Well appearing [General Appearance - Well Nourished] : well nourished [Oriented To Time, Place, And Person] : Oriented to person, place, and time [Impaired Insight] : Insight and judgment were intact [Affect] : The affect was normal. [Mood] : the mood was normal [Normal Station and Gait] : gait and station were normal [Tenderness] : no tenderness [Swelling] : swelling [Masses] : masses [Skin Changes - Describe changes:] : No skin changes noted [Full UE ROM unless otherwise noted:] : Full range of motion unless otherwise noted. [UE Motor Strength Normal unless otherwise noted:] : 5/5 strength in bilateral upper extremities unless otherwise noted. [Normal] : Sensation intact to light touch.

## 2021-05-04 ENCOUNTER — APPOINTMENT (OUTPATIENT)
Dept: GASTROENTEROLOGY | Facility: CLINIC | Age: 66
End: 2021-05-04
Payer: MEDICARE

## 2021-05-04 VITALS
DIASTOLIC BLOOD PRESSURE: 74 MMHG | HEIGHT: 71 IN | HEART RATE: 67 BPM | SYSTOLIC BLOOD PRESSURE: 133 MMHG | WEIGHT: 167 LBS | TEMPERATURE: 97.1 F | BODY MASS INDEX: 23.38 KG/M2

## 2021-05-04 DIAGNOSIS — K26.9 DUODENAL ULCER, UNSPECIFIED AS ACUTE OR CHRONIC, W/OUT HEMORRHAGE OR PERFORATION: ICD-10-CM

## 2021-05-04 DIAGNOSIS — K57.30 DIVERTICULOSIS OF LARGE INTESTINE W/OUT PERFORATION OR ABSCESS W/OUT BLEEDING: ICD-10-CM

## 2021-05-04 DIAGNOSIS — R10.10 UPPER ABDOMINAL PAIN, UNSPECIFIED: ICD-10-CM

## 2021-05-04 PROCEDURE — 82274 ASSAY TEST FOR BLOOD FECAL: CPT | Mod: QW

## 2021-05-04 PROCEDURE — 99214 OFFICE O/P EST MOD 30 MIN: CPT

## 2021-05-04 NOTE — PHYSICAL EXAM
[General Appearance - Alert] : alert [General Appearance - In No Acute Distress] : in no acute distress [General Appearance - Well Nourished] : well nourished [General Appearance - Well Developed] : well developed [General Appearance - Well-Appearing] : healthy appearing [Sclera] : the sclera and conjunctiva were normal [Neck Appearance] : the appearance of the neck was normal [Neck Cervical Mass (___cm)] : no neck mass was observed [Jugular Venous Distention Increased] : there was no jugular-venous distention [Thyroid Diffuse Enlargement] : the thyroid was not enlarged [Thyroid Nodule] : there were no palpable thyroid nodules [Auscultation Breath Sounds / Voice Sounds] : lungs were clear to auscultation bilaterally [Heart Rate And Rhythm] : heart rate was normal and rhythm regular [Heart Sounds] : normal S1 and S2 [Heart Sounds Gallop] : no gallops [Murmurs] : no murmurs [Heart Sounds Pericardial Friction Rub] : no pericardial rub [Full Pulse] : the pedal pulses are present [Edema] : there was no peripheral edema [Bowel Sounds] : normal bowel sounds [Abdomen Soft] : soft [Abdomen Mass (___ Cm)] : no abdominal mass palpated [Normal Sphincter Tone] : normal sphincter tone [No Rectal Mass] : no rectal mass [Occult Blood Positive] : stool positive for occult blood [Prostate Size___ (Scale 0-4)] : prostate size was [unfilled] on a scale of 0-4 [Cervical Lymph Nodes Enlarged Posterior Bilaterally] : posterior cervical [Cervical Lymph Nodes Enlarged Anterior Bilaterally] : anterior cervical [Supraclavicular Lymph Nodes Enlarged Bilaterally] : supraclavicular [Inguinal Lymph Nodes Enlarged Bilaterally] : inguinal [Abnormal Walk] : normal gait [Nail Clubbing] : no clubbing  or cyanosis of the fingernails [Musculoskeletal - Swelling] : no joint swelling seen [Skin Color & Pigmentation] : normal skin color and pigmentation [Skin Turgor] : normal skin turgor [] : no rash [Oriented To Time, Place, And Person] : oriented to person, place, and time [Impaired Insight] : insight and judgment were intact [Affect] : the affect was normal [Internal Hemorrhoid] : no internal hemorrhoids [External Hemorrhoid] : no external hemorrhoids [Prostate Tenderness] : was not tender [FreeTextEntry1] : onychomycoses

## 2021-05-04 NOTE — HISTORY OF PRESENT ILLNESS
[FreeTextEntry1] : David reports intermittent upper abdominal distress, occurring daily for a week, then he could go 10 days without any symptoms whatsoever.  Symptoms generally do not awaken him from sleep, can occur either on awakening or if he lies down too quickly after eating.  Symptoms can last anywhere from minutes to hours, fluctuating in intensity.  He has slight associated lightheadedness.  He also reports early satiety.  He has only minimal heartburn taking pantoprazole 40 mg daily, taking ondansetron 1-2 nights per week, with relief within 20 minutes.  Sugars have been better controlled on Farxiga (A1C decreased from 8.0 to 7.3).  He has been taking Plavix regularly since he may have had TIA 2 years ago.  He had been anemic, last H/H normalized.  Duodenal ulcers and nonerosive reflux esophagitis were noted at last EGD May 2016.  Diverticulosis and hemorrhoids were noted at last colonoscopy May 2016; he says that from time to time, his hemorrhoids have been flaring.

## 2021-05-04 NOTE — ASSESSMENT
[FreeTextEntry1] : 1. Intermittent epigastric pain, early satiety with heme + stool on today's exam; duodenal ulcers, nonerosive reflux esophagitis at EGD May 2016--rule out smoldering inflammation, ulcer, Helicobacter pylori, malignancy.  May have upper GI motility disorder, such as gastroparesis, with heme positive stool from hemorrhoids..\par 2. History of "colitis" and diverticulitis; diverticulosis, hemorrhoids at colonoscopy May 2016. Possible small left inguinal hernia on today's exam.\par 3. History of mild anemia, indices suggest chronic disease. \par 4. Type 2 diabetes mellitus.\par 5. Hypercholesterolemia.\par 6. Possible history of TIA, on Plavix.\par 7. BPH; left kidney stone.\par 8. Status post left arm lipoma excision.\par 9. Allergic to penicillin, sulfa.\par \par Plan:\par 1.  Latest labs reviewed.\par 2.  Schedule repeat EGD, given symptoms, subxiphoid tenderness and heme positive stool-- Procedure, rationale,  and anesthesia plan were discussed and brochure given. He is aware of the need to hold Plavix for several days prior.  He will ask his doctors how to adjust his diabetes medicines pre-procedure.\par 3.  Possible earlier repeat colonoscopy if no obvious cause of heme + stool identified at EGD. \par 4.  At time of discomfort, can increase ondansetron to as much as 8 mg AC 3 times daily.  \par

## 2021-06-04 DIAGNOSIS — M25.849 OTHER SPECIFIED JOINT DISORDERS, UNSPECIFIED HAND: ICD-10-CM

## 2021-06-11 ENCOUNTER — APPOINTMENT (OUTPATIENT)
Dept: DISASTER EMERGENCY | Facility: CLINIC | Age: 66
End: 2021-06-11

## 2021-06-11 ENCOUNTER — LABORATORY RESULT (OUTPATIENT)
Age: 66
End: 2021-06-11

## 2021-06-12 DIAGNOSIS — Z01.818 ENCOUNTER FOR OTHER PREPROCEDURAL EXAMINATION: ICD-10-CM

## 2021-06-16 ENCOUNTER — APPOINTMENT (OUTPATIENT)
Dept: GASTROENTEROLOGY | Facility: CLINIC | Age: 66
End: 2021-06-16
Payer: MEDICARE

## 2021-06-16 ENCOUNTER — LABORATORY RESULT (OUTPATIENT)
Age: 66
End: 2021-06-16

## 2021-06-16 PROCEDURE — 43239 EGD BIOPSY SINGLE/MULTIPLE: CPT

## 2021-06-22 ENCOUNTER — OUTPATIENT (OUTPATIENT)
Dept: OUTPATIENT SERVICES | Facility: HOSPITAL | Age: 66
LOS: 1 days | End: 2021-06-22
Payer: MEDICARE

## 2021-06-22 VITALS
RESPIRATION RATE: 14 BRPM | DIASTOLIC BLOOD PRESSURE: 70 MMHG | OXYGEN SATURATION: 98 % | HEIGHT: 71 IN | TEMPERATURE: 97 F | HEART RATE: 70 BPM | SYSTOLIC BLOOD PRESSURE: 110 MMHG | WEIGHT: 169.98 LBS

## 2021-06-22 DIAGNOSIS — M25.849 OTHER SPECIFIED JOINT DISORDERS, UNSPECIFIED HAND: ICD-10-CM

## 2021-06-22 DIAGNOSIS — Z90.49 ACQUIRED ABSENCE OF OTHER SPECIFIED PARTS OF DIGESTIVE TRACT: Chronic | ICD-10-CM

## 2021-06-22 LAB
A1C WITH ESTIMATED AVERAGE GLUCOSE RESULT: 8.1 % — HIGH (ref 4–5.6)
ALBUMIN SERPL ELPH-MCNC: 4.2 G/DL — SIGNIFICANT CHANGE UP (ref 3.3–5)
ALP SERPL-CCNC: 59 U/L — SIGNIFICANT CHANGE UP (ref 40–120)
ALT FLD-CCNC: 17 U/L — SIGNIFICANT CHANGE UP (ref 4–41)
ANION GAP SERPL CALC-SCNC: 12 MMOL/L — SIGNIFICANT CHANGE UP (ref 7–14)
AST SERPL-CCNC: 18 U/L — SIGNIFICANT CHANGE UP (ref 4–40)
BILIRUB SERPL-MCNC: 0.4 MG/DL — SIGNIFICANT CHANGE UP (ref 0.2–1.2)
BUN SERPL-MCNC: 16 MG/DL — SIGNIFICANT CHANGE UP (ref 7–23)
CALCIUM SERPL-MCNC: 9.3 MG/DL — SIGNIFICANT CHANGE UP (ref 8.4–10.5)
CHLORIDE SERPL-SCNC: 104 MMOL/L — SIGNIFICANT CHANGE UP (ref 98–107)
CO2 SERPL-SCNC: 24 MMOL/L — SIGNIFICANT CHANGE UP (ref 22–31)
CREAT SERPL-MCNC: 0.81 MG/DL — SIGNIFICANT CHANGE UP (ref 0.5–1.3)
ESTIMATED AVERAGE GLUCOSE: 186 MG/DL — HIGH (ref 68–114)
GLUCOSE SERPL-MCNC: 215 MG/DL — HIGH (ref 70–99)
HCT VFR BLD CALC: 39 % — SIGNIFICANT CHANGE UP (ref 39–50)
HGB BLD-MCNC: 12.4 G/DL — LOW (ref 13–17)
MCHC RBC-ENTMCNC: 29 PG — SIGNIFICANT CHANGE UP (ref 27–34)
MCHC RBC-ENTMCNC: 31.8 GM/DL — LOW (ref 32–36)
MCV RBC AUTO: 91.3 FL — SIGNIFICANT CHANGE UP (ref 80–100)
NRBC # BLD: 0 /100 WBCS — SIGNIFICANT CHANGE UP
NRBC # FLD: 0 K/UL — SIGNIFICANT CHANGE UP
PLATELET # BLD AUTO: 204 K/UL — SIGNIFICANT CHANGE UP (ref 150–400)
POTASSIUM SERPL-MCNC: 4.1 MMOL/L — SIGNIFICANT CHANGE UP (ref 3.5–5.3)
POTASSIUM SERPL-SCNC: 4.1 MMOL/L — SIGNIFICANT CHANGE UP (ref 3.5–5.3)
PROT SERPL-MCNC: 6.3 G/DL — SIGNIFICANT CHANGE UP (ref 6–8.3)
RBC # BLD: 4.27 M/UL — SIGNIFICANT CHANGE UP (ref 4.2–5.8)
RBC # FLD: 14.2 % — SIGNIFICANT CHANGE UP (ref 10.3–14.5)
SODIUM SERPL-SCNC: 140 MMOL/L — SIGNIFICANT CHANGE UP (ref 135–145)
WBC # BLD: 6.93 K/UL — SIGNIFICANT CHANGE UP (ref 3.8–10.5)
WBC # FLD AUTO: 6.93 K/UL — SIGNIFICANT CHANGE UP (ref 3.8–10.5)

## 2021-06-22 PROCEDURE — 93010 ELECTROCARDIOGRAM REPORT: CPT

## 2021-06-22 RX ORDER — OMEPRAZOLE 10 MG/1
1 CAPSULE, DELAYED RELEASE ORAL
Qty: 0 | Refills: 0 | DISCHARGE

## 2021-06-22 RX ORDER — INSULIN LISPRO 100/ML
4 VIAL (ML) SUBCUTANEOUS
Qty: 0 | Refills: 0 | DISCHARGE

## 2021-06-22 RX ORDER — INSULIN GLARGINE 100 [IU]/ML
36 INJECTION, SOLUTION SUBCUTANEOUS
Qty: 0 | Refills: 0 | DISCHARGE

## 2021-06-22 RX ORDER — SODIUM CHLORIDE 9 MG/ML
1000 INJECTION, SOLUTION INTRAVENOUS
Refills: 0 | Status: DISCONTINUED | OUTPATIENT
Start: 2021-07-07 | End: 2021-07-21

## 2021-06-22 NOTE — H&P PST ADULT - HISTORY OF PRESENT ILLNESS
63 y/o M with PMH of DM2, hypercholesterolemia, and gastroparesis, GERD, BPH. Presents to PST w/ a preop dx of other specified joint disorders, unspecified hand and to be evaluated for a scheduled right index finger mass iops resection on 7/7/21. Pt states noted a small nodule to rt index finger x 1 year. Increased size noted, went to see Dr Roche who attempted aspiration w/o success Pt then recommended an MRI and revealed a non cancerous tumor and was instructed removal at this time.

## 2021-06-22 NOTE — H&P PST ADULT - ATTENDING COMMENTS
I have reviewed and agree with note as written above  for elective resection right index finger mass  Risks, benefits and alternatives discussed with patient.  Ben Roche MD  Musculoskeletal Oncology  [phone]

## 2021-06-22 NOTE — H&P PST ADULT - HEALTH CARE MAINTENANCE
Colonoscopy: wnl, last test done 5 years ago / egd 1 wk ago and wnl   flu vaccine: 2020  Covid Vaccine: moderna x2   Denies current covid S&S or in the past, test neg when done. Pt denies history of travel outside the country and outside [place] and denies COVID19 positive contacts within the last 14 days.

## 2021-06-22 NOTE — H&P PST ADULT - MUSCULOSKELETAL
rt hand/ index finger stiffness/no joint erythema/no joint warmth/no calf tenderness/decreased ROM/joint swelling/diminished strength details… detailed exam

## 2021-06-22 NOTE — H&P PST ADULT - NSICDXPROBLEM_GEN_ALL_CORE_FT
PROBLEM DIAGNOSES  Problem: Other specified joint disorders, unspecified hand  Assessment and Plan: Preop instructions provided including npo status, Hibiclens wash for infection control and GI prophylasix. Pt to c/w current meds, aware of last DM medication farxiga to e taken 72-48 hrs prior to sx, januvia last in am on 6/6, no pm dose and hold on dos, tujeo last on 7/7 am and decreased by 20 % as per protocol. Humalog last dose on 6/6 w/ dinner. Plavix to be clarified by cardio on CC. take ppi in am dos as ordered. c/w pm statin and glomax as ordered. Pt. aware to stop any NSAIDS, OTC herbals or MVI on 6/30/21. Verbilized understanding. BW done, pending results. DVT prophylasix as per primary team. Endo and CC pending, forms provided. Covid Vaccine card in [place] but states was asked by surgeon to get testing prior to sx and has an appt. Allergy norified to OR booking via fax.

## 2021-06-22 NOTE — H&P PST ADULT - NSICDXPASTMEDICALHX_GEN_ALL_CORE_FT
PAST MEDICAL HISTORY:  BPH (benign prostatic hyperplasia) not on BPH meds    Diabetes     Gastroparesis     GERD (gastroesophageal reflux disease)     Hypercholesteremia

## 2021-06-22 NOTE — H&P PST ADULT - NSANTHOSAYNRD_GEN_A_CORE
Denies sleep studies done in the past/No. JOSE screening performed.  STOP BANG Legend: 0-2 = LOW Risk; 3-4 = INTERMEDIATE Risk; 5-8 = HIGH Risk

## 2021-06-22 NOTE — H&P PST ADULT - NSICDXFAMILYHX_GEN_ALL_CORE_FT
FAMILY HISTORY:  Father  Still living? No  Family history of lung cancer, Age at diagnosis: Age Unknown  Family history of stroke, Age at diagnosis: Age Unknown    Mother  Still living? No  Family history of Alzheimer's disease, Age at diagnosis: Age Unknown

## 2021-06-22 NOTE — H&P PST ADULT - ASSESSMENT
DX: other specified joint disorders, unspecified hand and evaluated for a scheduled right index finger mass iops resection on 7/7/21

## 2021-06-22 NOTE — H&P PST ADULT - NEGATIVE SKIN SYMPTOMS
no rash/no itching/no dryness/no change in size/color of mole/no brittle nails/no pitted nails/no hair loss

## 2021-06-23 ENCOUNTER — NON-APPOINTMENT (OUTPATIENT)
Age: 66
End: 2021-06-23

## 2021-07-01 ENCOUNTER — APPOINTMENT (OUTPATIENT)
Dept: ORTHOPEDIC SURGERY | Facility: CLINIC | Age: 66
End: 2021-07-01

## 2021-07-04 ENCOUNTER — APPOINTMENT (OUTPATIENT)
Dept: DISASTER EMERGENCY | Facility: CLINIC | Age: 66
End: 2021-07-04

## 2021-07-04 LAB — SARS-COV-2 N GENE NPH QL NAA+PROBE: NOT DETECTED

## 2021-07-06 ENCOUNTER — TRANSCRIPTION ENCOUNTER (OUTPATIENT)
Age: 66
End: 2021-07-06

## 2021-07-06 ENCOUNTER — APPOINTMENT (OUTPATIENT)
Dept: DISASTER EMERGENCY | Facility: CLINIC | Age: 66
End: 2021-07-06

## 2021-07-06 NOTE — ASU PATIENT PROFILE, ADULT - PMH
BPH (benign prostatic hyperplasia)  not on BPH meds  Diabetes    Gastroparesis    GERD (gastroesophageal reflux disease)    Hypercholesteremia

## 2021-07-07 ENCOUNTER — APPOINTMENT (OUTPATIENT)
Dept: ORTHOPEDIC SURGERY | Facility: HOSPITAL | Age: 66
End: 2021-07-07

## 2021-07-07 ENCOUNTER — OUTPATIENT (OUTPATIENT)
Dept: OUTPATIENT SERVICES | Facility: HOSPITAL | Age: 66
LOS: 1 days | Discharge: HOME | End: 2021-07-07
Payer: MEDICARE

## 2021-07-07 ENCOUNTER — RESULT REVIEW (OUTPATIENT)
Age: 66
End: 2021-07-07

## 2021-07-07 VITALS
WEIGHT: 169.98 LBS | TEMPERATURE: 98 F | DIASTOLIC BLOOD PRESSURE: 71 MMHG | SYSTOLIC BLOOD PRESSURE: 128 MMHG | HEART RATE: 64 BPM | OXYGEN SATURATION: 100 % | HEIGHT: 71 IN | RESPIRATION RATE: 16 BRPM

## 2021-07-07 VITALS
HEART RATE: 55 BPM | TEMPERATURE: 97 F | RESPIRATION RATE: 18 BRPM | SYSTOLIC BLOOD PRESSURE: 146 MMHG | OXYGEN SATURATION: 99 % | DIASTOLIC BLOOD PRESSURE: 68 MMHG

## 2021-07-07 DIAGNOSIS — M25.849 OTHER SPECIFIED JOINT DISORDERS, UNSPECIFIED HAND: ICD-10-CM

## 2021-07-07 DIAGNOSIS — Z90.49 ACQUIRED ABSENCE OF OTHER SPECIFIED PARTS OF DIGESTIVE TRACT: Chronic | ICD-10-CM

## 2021-07-07 PROCEDURE — 88305 TISSUE EXAM BY PATHOLOGIST: CPT | Mod: 26

## 2021-07-07 PROCEDURE — 26113 EXC HAND TUM DEEP 1.5 CM/>: CPT | Mod: F6

## 2021-07-07 RX ORDER — DAPAGLIFLOZIN 10 MG/1
1 TABLET, FILM COATED ORAL
Qty: 0 | Refills: 0 | DISCHARGE

## 2021-07-07 RX ORDER — INSULIN GLARGINE 100 [IU]/ML
36 INJECTION, SOLUTION SUBCUTANEOUS
Qty: 0 | Refills: 0 | DISCHARGE

## 2021-07-07 RX ORDER — SITAGLIPTIN 50 MG/1
1 TABLET, FILM COATED ORAL
Qty: 0 | Refills: 0 | DISCHARGE

## 2021-07-07 RX ORDER — INSULIN LISPRO 100/ML
3 VIAL (ML) SUBCUTANEOUS
Qty: 0 | Refills: 0 | DISCHARGE

## 2021-07-07 RX ORDER — PANTOPRAZOLE SODIUM 20 MG/1
1 TABLET, DELAYED RELEASE ORAL
Qty: 0 | Refills: 0 | DISCHARGE

## 2021-07-07 RX ORDER — TAMSULOSIN HYDROCHLORIDE 0.4 MG/1
0.4 CAPSULE ORAL
Qty: 0 | Refills: 0 | DISCHARGE

## 2021-07-07 NOTE — ASU DISCHARGE PLAN (ADULT/PEDIATRIC) - CARE PROVIDER_API CALL
Ben Roche (MD)  Orthopaedic Surgery  [address], [address]  [place] [address]  Phone: [phone]  Fax: [phone]  Follow Up Time:

## 2021-07-07 NOTE — ASU DISCHARGE PLAN (ADULT/PEDIATRIC) - ASU DC SPECIAL INSTRUCTIONSFT
1. Keep bandage on. Cover hand with plastic bag for showering.    2. If you have a splint on, keep it on until you see Dr. Roche in the office. Do not get the splint wet because it will break down and not good for your skin.    3. Elevate hand as much as possible and wiggle fingers as much as you can, as often as you think of it (if you are watching TV every commercial wiggle 10 times, or reading a book wiggle 10 times after every 5 pages read). The exception is if you have a splint you will not be able to wiggle the affected digit. Try to wiggle the free ones though.    4. Walk plenty, no sitting around.    5. See Dr. Roche in the office in about 7-10 days. Call to schedule. You will have you wound checked then, any sutures will be removed, and you splint will be changed if you have one on.

## 2021-07-07 NOTE — ASU DISCHARGE PLAN (ADULT/PEDIATRIC) - CALL YOUR DOCTOR IF YOU HAVE ANY OF THE FOLLOWING:
Bleeding that does not stop/Pain not relieved by Medications/Fever greater than (need to indicate Fahrenheit or Celsius)/Numbness, tingling, color or temperature change to extremity Bleeding that does not stop/Pain not relieved by Medications/Fever greater than (need to indicate Fahrenheit or Celsius)/Numbness, tingling, color or temperature change to extremity/Nausea and vomiting that does not stop/Inability to tolerate liquids or foods

## 2021-07-19 ENCOUNTER — APPOINTMENT (OUTPATIENT)
Dept: ORTHOPEDIC SURGERY | Facility: CLINIC | Age: 66
End: 2021-07-19
Payer: MEDICARE

## 2021-07-19 DIAGNOSIS — M67.40 GANGLION, UNSPECIFIED SITE: ICD-10-CM

## 2021-07-19 PROCEDURE — 99024 POSTOP FOLLOW-UP VISIT: CPT

## 2021-07-19 NOTE — HISTORY OF PRESENT ILLNESS
[___ Weeks Post Op] : [unfilled] weeks post op [Clean/Dry/Intact] : clean, dry and intact [Healed] : healed [Swelling] : not swollen [Vascular Intact] : ~T peripheral vascular exam normal [Negative Roseanna's] : maneuvers demonstrated a negative Roseanna's sign [Sutures Removed] : sutures were removed [de-identified] : 7/7/2016 -resection right index finger mass [de-identified] : Patient is doing well postoperatively.  He has less pain than before.  He has a little bit of numbness in the distal tuft on the ulnar side.  He does not have numbness from the incision all the way rather just at the distal tip.  He is moving better than before. [de-identified] : On exam his incision is clean dry and intact.  Motion is improved but still somewhat tight at the MP joint. [de-identified] : Pathology is consistent with ganglion cyst. [de-identified] : Patient is doing well at this point.  He has an asymptomatic lesion on his other hand most likely the same thing.  My recommendation is to follow-up with hand surgery as needed.  I have also written him for occupational therapy to get range of motion back. [de-identified] : We discussed it is unlikely that this ganglion cyst back however it is a sign of arthritis.  Also his paresthesias are likely to prove if not completely resolved.  There may be an area of numbness forever.  I will see him again as needed.\par \par If imaging was ordered, the patient was told to make an appointment to review findings right after all imaging is completed.\par \par We discussed risks, benefits and alternatives. Rationale of care was reviewed and all questions were answered. Patient (and family) had all questions answered to her degree of the level of satisfaction. Patient (and family) expressed understanding and interest in proceeding with the plan as outlined.\par \par \par \par \par This note was done with a voice recognition transcription software and any typos are related to this rather than medical error. Surgical risks reviewed. Patient (and family) had all questions answered to an agreeable level of satisfaction. Patient (and family) expressed understanding and interest in proceeding with the plan as outlined.  \par

## 2021-08-23 PROBLEM — K21.9 GASTRO-ESOPHAGEAL REFLUX DISEASE WITHOUT ESOPHAGITIS: Chronic | Status: ACTIVE | Noted: 2021-06-22

## 2021-09-22 ENCOUNTER — RX RENEWAL (OUTPATIENT)
Age: 66
End: 2021-09-22

## 2021-10-06 PROBLEM — K26.9 MULTIPLE DUODENAL ULCERS: Status: ACTIVE | Noted: 2021-05-04

## 2021-11-18 ENCOUNTER — NON-APPOINTMENT (OUTPATIENT)
Age: 66
End: 2021-11-18

## 2021-11-23 ENCOUNTER — APPOINTMENT (OUTPATIENT)
Dept: GASTROENTEROLOGY | Facility: CLINIC | Age: 66
End: 2021-11-23
Payer: MEDICARE

## 2021-11-23 VITALS
HEART RATE: 60 BPM | DIASTOLIC BLOOD PRESSURE: 55 MMHG | WEIGHT: 174 LBS | HEIGHT: 71 IN | BODY MASS INDEX: 24.36 KG/M2 | SYSTOLIC BLOOD PRESSURE: 113 MMHG

## 2021-11-23 PROCEDURE — 82272 OCCULT BLD FECES 1-3 TESTS: CPT

## 2021-11-23 PROCEDURE — 99214 OFFICE O/P EST MOD 30 MIN: CPT

## 2021-11-23 RX ORDER — DAPAGLIFLOZIN 10 MG/1
10 TABLET, FILM COATED ORAL
Refills: 0 | Status: DISCONTINUED | COMMUNITY
End: 2021-11-23

## 2021-11-23 RX ORDER — POLYETHYLENE GLYCOL 3350, SODIUM CHLORIDE, SODIUM BICARBONATE AND POTASSIUM CHLORIDE WITH LEMON FLAVOR 420; 11.2; 5.72; 1.48 G/4L; G/4L; G/4L; G/4L
420 POWDER, FOR SOLUTION ORAL
Qty: 1 | Refills: 0 | Status: DISCONTINUED | COMMUNITY
Start: 2021-06-16 | End: 2021-11-23

## 2021-11-23 NOTE — HISTORY OF PRESENT ILLNESS
[FreeTextEntry1] : Because of heme positive stool found earlier this year, David underwent repeat EGD 6/16/2021, which was essentially negative.  He was advised to repeat the colonoscopy soon thereafter, last performed May 2016 (diverticulosis and hemorrhoids), but he took far too long to schedule, so he returns today.  Upper GI symptoms relapse only if he eats too close to bedtime, but then he would take ondansetron and feel better.  He also reports tendency towards constipation, but this has improved by increasing ingestion of bran, nuts, and vegetables.  He continues on Plavix daily for possible TIA.  Last Hgb 13.2 with normal MCV.

## 2021-11-23 NOTE — REVIEW OF SYSTEMS
[Abdominal Pain] : abdominal pain [Constipation] : constipation [Negative] : Heme/Lymph [As Noted in HPI] : as noted in HPI

## 2021-11-23 NOTE — ASSESSMENT
[FreeTextEntry1] : 1.  Recurrent heme positive stool with no obvious cause found at repeat EGD June 2021; diverticulosis and hemorrhoids at last colonoscopy May 2016--rule out lower GI source, such as polyps or dafne malignancy.  May simply be hemorrhoidal in origin, however.\par 2.  History of duodenal ulcers. EGD May 2016.  Intermittent upper abdominal distress, early satiety likely from underlying motility disorder, such as diabetic gastroparesis.\par 3. History of mild anemia, indices suggest chronic disease. \par 4. Type 2 diabetes mellitus.\par 5. Hypercholesterolemia.\par 6. Possible history of TIA, maintained on Plavix.\par 7. BPH; left kidney stone.\par 8. Status post left arm lipoma excision.\par 9. Allergic to penicillin, sulfa.\par \par Plan:\par 1.  Latest labs reviewed.\par 2.  Schedule repeat colonoscopy--he is aware of the need to hold Plavix for several days prior and to adjust his diabetes medicines prior to procedure. Procedure, rationale, anesthesia plan, and PEG prep instructions were again reviewed and brochure has been given.\par 3.  Other recommendations to follow.\par \par

## 2021-11-23 NOTE — PHYSICAL EXAM
[General Appearance - Alert] : alert [General Appearance - In No Acute Distress] : in no acute distress [General Appearance - Well Nourished] : well nourished [General Appearance - Well Developed] : well developed [General Appearance - Well-Appearing] : healthy appearing [Sclera] : the sclera and conjunctiva were normal [Neck Appearance] : the appearance of the neck was normal [Neck Cervical Mass (___cm)] : no neck mass was observed [Jugular Venous Distention Increased] : there was no jugular-venous distention [Thyroid Diffuse Enlargement] : the thyroid was not enlarged [Thyroid Nodule] : there were no palpable thyroid nodules [Auscultation Breath Sounds / Voice Sounds] : lungs were clear to auscultation bilaterally [Heart Rate And Rhythm] : heart rate was normal and rhythm regular [Heart Sounds] : normal S1 and S2 [Heart Sounds Gallop] : no gallops [Murmurs] : no murmurs [Heart Sounds Pericardial Friction Rub] : no pericardial rub [Full Pulse] : the pedal pulses are present [Edema] : there was no peripheral edema [Bowel Sounds] : normal bowel sounds [Abdomen Soft] : soft [Abdomen Mass (___ Cm)] : no abdominal mass palpated [Normal Sphincter Tone] : normal sphincter tone [No Rectal Mass] : no rectal mass [Internal Hemorrhoid] : internal hemorrhoids [Occult Blood Positive] : stool positive for occult blood [Prostate Size___ (Scale 0-4)] : prostate size was [unfilled] on a scale of 0-4 [Skin Color & Pigmentation] : normal skin color and pigmentation [Skin Turgor] : normal skin turgor [] : no rash [Oriented To Time, Place, And Person] : oriented to person, place, and time [Impaired Insight] : insight and judgment were intact [Affect] : the affect was normal [External Hemorrhoid] : no external hemorrhoids [Prostate Tenderness] : was not tender [FreeTextEntry1] : onychomycoses

## 2021-12-05 ENCOUNTER — TRANSCRIPTION ENCOUNTER (OUTPATIENT)
Age: 66
End: 2021-12-05

## 2021-12-13 ENCOUNTER — APPOINTMENT (OUTPATIENT)
Dept: GASTROENTEROLOGY | Facility: CLINIC | Age: 66
End: 2021-12-13
Payer: MEDICARE

## 2021-12-13 PROCEDURE — 45378 DIAGNOSTIC COLONOSCOPY: CPT

## 2022-01-13 ENCOUNTER — RX RENEWAL (OUTPATIENT)
Age: 67
End: 2022-01-13

## 2022-01-20 ENCOUNTER — APPOINTMENT (OUTPATIENT)
Dept: UROLOGY | Facility: CLINIC | Age: 67
End: 2022-01-20
Payer: MEDICARE

## 2022-01-20 VITALS
HEIGHT: 71 IN | BODY MASS INDEX: 25.06 KG/M2 | SYSTOLIC BLOOD PRESSURE: 130 MMHG | WEIGHT: 179 LBS | TEMPERATURE: 98.1 F | DIASTOLIC BLOOD PRESSURE: 62 MMHG

## 2022-01-20 PROCEDURE — 99213 OFFICE O/P EST LOW 20 MIN: CPT

## 2022-01-20 NOTE — ASSESSMENT
[FreeTextEntry1] : His examination appears to be unchanged. PSA level was reviewed. Patient states that he is going back for annual physical exam very soon. Also he will undergo repeat renal ultrasound in follow-up to lower pole kidney stone which has been observed so far since he is asymptomatic. He will continue to remain hydrated. He will continue with tamsulosin. He can follow-up in 6 months to 1 year.\par \par David Tran MD, FACS\par [facility]\par  of Urology\par [address] [place] [address], [address]\[place] [address]\par Tel: [phone]\par Fax: [phone]\par

## 2022-01-20 NOTE — HISTORY OF PRESENT ILLNESS
[FreeTextEntry1] : He is a 66-year-old man who is seen today in follow-up for urinary symptoms and kidney stone. He has no flank pain. There is no hematuria or dysuria. Urinary symptoms are relatively unchanged which is nocturia 2 or 3 times with tamsulosin. PSA level was 2 in January 2021. Ultrasound also in January 2021 showed a left lower pole kidney stone 5 mm. The prostate measured about 76 cc. His A1c level was 7.8. He does not complain of erectile dysfunction.\par \par Previous notes: Ultrasound in February 2020 showed 1.1 cm left lower pole kidney stone and a small parapelvic renal cyst.  Prostate measured 63 g and the residual urine volume was minimal. \par Ultrasound in February 2019 showed that the left lower pole kidney stone was 9 mm and there was a parapelvic renal cyst. Maximum urinary flow was 15.8 and average flow 8 mL per second. He voided 200 cc and residual urine was 50 cc.\par As a teenager, he may have had dilation of the urethra or meatotomy. CT scan in 2016 showed left renal stone 5 mm and enlarged prostate.

## 2022-01-20 NOTE — LETTER BODY
[Dear  ___] : Dear  [unfilled], [Attached please find my note.] : Attached please find my note. [Thank you very much for allowing me to participate in the care of this patient. If you have any questions, please do not hesitate to contact me] : Thank you very much for allowing me to participate in the care of this patient. If you have any questions, please do not hesitate to contact me. [FreeTextEntry1] : [address] [place] [address] \par  [phone]\par

## 2022-01-20 NOTE — PHYSICAL EXAM
[General Appearance - Well Developed] : well developed [General Appearance - Well Nourished] : well nourished [Normal Appearance] : normal appearance [Well Groomed] : well groomed [General Appearance - In No Acute Distress] : no acute distress [Abdomen Soft] : soft [Abdomen Tenderness] : non-tender [Costovertebral Angle Tenderness] : no ~M costovertebral angle tenderness [Urethral Meatus] : meatus normal [Penis Abnormality] : normal circumcised penis [Urinary Bladder Findings] : the bladder was normal on palpation [Scrotum] : the scrotum was normal [Epididymis] : the epididymides were normal [Testes Tenderness] : no tenderness of the testes [Testes Mass (___cm)] : there were no testicular masses [Prostate Tenderness] : the prostate was not tender [No Prostate Nodules] : no prostate nodules [Prostate Enlarged] : was enlarged [FreeTextEntry1] : EVELYN done on January 2022 [] : no respiratory distress [Respiration, Rhythm And Depth] : normal respiratory rhythm and effort [Exaggerated Use Of Accessory Muscles For Inspiration] : no accessory muscle use

## 2022-01-21 ENCOUNTER — NON-APPOINTMENT (OUTPATIENT)
Age: 67
End: 2022-01-21

## 2022-01-21 LAB — PSA SERPL-MCNC: 2.14 NG/ML

## 2022-01-26 ENCOUNTER — APPOINTMENT (OUTPATIENT)
Dept: ULTRASOUND IMAGING | Facility: CLINIC | Age: 67
End: 2022-01-26
Payer: MEDICARE

## 2022-01-26 ENCOUNTER — OUTPATIENT (OUTPATIENT)
Dept: OUTPATIENT SERVICES | Facility: HOSPITAL | Age: 67
LOS: 1 days | End: 2022-01-26
Payer: MEDICARE

## 2022-01-26 DIAGNOSIS — N20.0 CALCULUS OF KIDNEY: ICD-10-CM

## 2022-01-26 DIAGNOSIS — Z90.49 ACQUIRED ABSENCE OF OTHER SPECIFIED PARTS OF DIGESTIVE TRACT: Chronic | ICD-10-CM

## 2022-01-26 PROCEDURE — 76775 US EXAM ABDO BACK WALL LIM: CPT

## 2022-01-26 PROCEDURE — 76775 US EXAM ABDO BACK WALL LIM: CPT | Mod: 26

## 2022-01-28 ENCOUNTER — NON-APPOINTMENT (OUTPATIENT)
Age: 67
End: 2022-01-28

## 2022-02-28 ENCOUNTER — NON-APPOINTMENT (OUTPATIENT)
Age: 67
End: 2022-02-28

## 2022-03-17 ENCOUNTER — APPOINTMENT (OUTPATIENT)
Dept: OTOLARYNGOLOGY | Facility: CLINIC | Age: 67
End: 2022-03-17
Payer: MEDICARE

## 2022-03-17 VITALS
TEMPERATURE: 97.5 F | BODY MASS INDEX: 24.5 KG/M2 | WEIGHT: 175 LBS | HEART RATE: 57 BPM | HEIGHT: 71 IN | DIASTOLIC BLOOD PRESSURE: 65 MMHG | SYSTOLIC BLOOD PRESSURE: 116 MMHG

## 2022-03-17 DIAGNOSIS — J34.2 DEVIATED NASAL SEPTUM: ICD-10-CM

## 2022-03-17 PROCEDURE — 99204 OFFICE O/P NEW MOD 45 MIN: CPT | Mod: 25

## 2022-03-17 PROCEDURE — 30901 CONTROL OF NOSEBLEED: CPT | Mod: RT

## 2022-03-17 NOTE — END OF VISIT
[FreeTextEntry3] : I saw and examined this patient in person. I have discussed with Ashley Mccrary, Physician Assistant, in detail the above note and agree with the above assessment and plan of care.\par

## 2022-03-17 NOTE — CONSULT LETTER
[Dear  ___] : Dear  [unfilled], [Consult Letter:] : I had the pleasure of evaluating your patient, [unfilled]. [Please see my note below.] : Please see my note below. [Consult Closing:] : Thank you very much for allowing me to participate in the care of this patient.  If you have any questions, please do not hesitate to contact me. [Sincerely,] : Sincerely, [FreeTextEntry3] : Cain Burris MD\par [facility]\par Otolaryngology and Facial Plastics\par Associated Professor, [employer]\par

## 2022-03-17 NOTE — ASSESSMENT
[FreeTextEntry1] : Patient on Eliquis for history of questionable TIAs.  Apparently Ecotrin is contraindicated because of GI ulcers.  Has some staining in his right nasal cavity just about every morning here for evaluation on examination endoscopically severely deviated septum to the left there is some irritation along the mucus closer of his septum on the right side Surgicel was placed this was explained to him why we did not want to cauterize hopefully this will help and minimize the frequency of recurrent staining.  Will follow up with us as needed.

## 2022-03-17 NOTE — HISTORY OF PRESENT ILLNESS
[de-identified] : Patient has been waking up wit nasal congestion in the right nasal cavity for the last few weeks. He has nosebleeds in  the morning when he blows his nose from the right nasal cavity. He does take Plavix due to a possible history of TIA, instead of aspirin as a result of GI issues. The last time he saw blood was this morning. He states that the bleeding is mild, but it happens every time he has to blow his nose.

## 2022-03-28 ENCOUNTER — RX RENEWAL (OUTPATIENT)
Age: 67
End: 2022-03-28

## 2022-06-01 NOTE — ASU DISCHARGE PLAN (ADULT/PEDIATRIC) - NURSING INSTRUCTIONS
PATIENT TAKEN FOR X-RAY VIA WHEELCHAIR DO NOT take any Tylenol (Acetaminophen) or narcotics containing Tylenol until after  6:00pm______ . You received Tylenol during your operation and it can cause damage to your liver if too much is taken within a 24 hour time period.

## 2022-06-23 ENCOUNTER — NON-APPOINTMENT (OUTPATIENT)
Age: 67
End: 2022-06-23

## 2022-06-24 ENCOUNTER — RX RENEWAL (OUTPATIENT)
Age: 67
End: 2022-06-24

## 2022-11-09 ENCOUNTER — NON-APPOINTMENT (OUTPATIENT)
Age: 67
End: 2022-11-09

## 2022-11-22 ENCOUNTER — APPOINTMENT (OUTPATIENT)
Dept: SPORTS MEDICINE | Facility: CLINIC | Age: 67
End: 2022-11-22

## 2022-11-22 VITALS — HEIGHT: 71 IN | BODY MASS INDEX: 24.92 KG/M2 | WEIGHT: 178 LBS

## 2022-11-22 DIAGNOSIS — M25.529 PAIN IN UNSPECIFIED ELBOW: ICD-10-CM

## 2022-11-22 DIAGNOSIS — M25.562 PAIN IN LEFT KNEE: ICD-10-CM

## 2022-11-22 PROCEDURE — 20610 DRAIN/INJ JOINT/BURSA W/O US: CPT

## 2022-11-22 PROCEDURE — 99204 OFFICE O/P NEW MOD 45 MIN: CPT | Mod: 25

## 2022-11-23 NOTE — DISCUSSION/SUMMARY
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  Both steroids and hyaluronic acid were injected in the patient's left knee following Baker's cyst aspiration.  Patient reported significant improvement in pain.  Given that patient is on Plavix, NSAIDs were not prescribed, patient was encouraged to use Tylenol as needed for pain.  Physical therapy prescription was provided, patient was advised to return in 2 weeks for reevaluation of his pain.\par \par Secondary diagnosis -left elbow OA\par \par Patient with long-term OA in appearance and on clinical exam, will add onto prescription for PT for patient along with bilateral knees, will reevaluate in 2 weeks following physical therapy.\par \par Attending Attestation:\par \par Seen with Dr. Robles.\par I saw and evaluated the patient and was involved with and agree with the fellow's history, physical exam, and I personally documented the assessment and plan of care.\par \par ASSESSMENT: \par \par Patient with history of insulin-dependent diabetes (with continuous glucose monitor), hyperlipidemia, PUD, currently taking Plavix who is left-hand dominant, retired but plays golf and takes care of grandkids with:\par \par 1. Left chronic elbow pain with decreased range of motion in extension, atraumatic, no neurologic symptoms. Associated with stiffness, with decreased range of motion approximately 10 degrees of extensor lag. Concerning for osteoarthritis. X-ray with evidence of moderate osteoarthritis. Would benefit from PT, OTC analgesics, and range of motion exercises. If not improved, consider corticosteroid injection versus referral for operative intervention. \par \par 2. Left knee pain, chronic, atraumatic. Concern for osteoarthritis was significantly affecting the patellofemoral joint, likely as a result of shallow trochlea. There is a osseous intra-articular body noted suprapatellar recess.  Imaging concerning for severe osteoarthritis in the patellofemoral joint, with mild osteoarthritis in the medial and lateral compartments. Patient with Baker's cyst on MSK ultrasound, would benefit from aspiration given the size and patient's symptoms over the posterior knee. Consider corticosteroid injection as well as HA injection. Prolonged discussion had with patient regarding importance of frequents glucose checks and increase hydration and appropriate insulin use given the risk of hyperglycemia with steroids. Will prescribe patient with PT, have patient take OTC analgesics, and perform HEP. If not improving after injections, consider referral for operative intervention with arthroplasty.\par \par Will treat symptoms with conservative management with activity modification, analgesic medications, HEP/PT, and re-evaluate the patient to see if they would benefit from further diagnostic testing, or referral for injection therapy or surgical intervention.\par \par Clinical and radiographic findings discussed. Diagnosis, prognosis and treatment options reviewed. Patient verbalizes understanding and all questions answered.\par The patient adamant at this time about not having any type of surgical intervention. Wants only to continue with non-operative management. \par \par Patient adamant at this time about not having any type of Sx intervention. Wants only to continue with conservative approach.\par \par PLAN:\par Additional Testing: X-ray of elbow and knee\par Further Intervention: Patient would like to continue with non-interventional measures as well as injection therapy\par Weight-bearing Status: WBAT\par Assistive Devices: None\par Therapy: HEP recommended and reviewed, PT prescribed\par Health Management: BMI/Weight Management and physical activity level reviewed with the patient\par Home Modalities: RICE protocol for pain/swelling and home modalities reviewed with the patient\par Medications: OTC analgesics and avoid NSAIDs as patient on Plavix\par Orthotics: None\par Work Status: Retired\par Activity Status: Avoid aggravating and high impact activities\par Sports/Gym Status: No gym or sports until cleared medically\par Follow-up: 1 week for [place]\par  \par Alex Trinidad MD.

## 2022-11-23 NOTE — PHYSICAL EXAM
[de-identified] : Elbow Exam (Bilateral)\par \par General Appearance: Well-nourished, well developed in no acute distress \par Orientation: Oriented to person, place and time. \par Gait: normal Non-labored breathing, no stridor \par C spine: full painless ROM, no midline spinal tenderness \par \par Inspection/Palpation \par UE (R/L): TTP over both cubital tunnels, and olecranon, \par No thenar or hypothenar atrophy \par Elbow ROM (R/L): \par Flexion 0-140/ 0-140, \par Supination (R/L): 80/80, \par Pronation (R/L): 80/80 (with pain) \par Elbow Stability (R/L): no varus or valgus laxity bilaterally \par Elbow Flexion Strength (R/L): 5/5 / 5/5\par Elbow Extension Strength (R/L): 5/5 / 5/5 \par Wrist Extension Strength (R/L): 5/5 / 5/5\par Wrist Flexion Strength (R/L): 5/5 / 5/5 \par Intrinsics of Hand Strength (R/L): 5/5 / 5/5 \par Sensation: Subjective normal median, ulnar, radial and axillary sensation bilaterally\par Ulnar distribution numbness with elbow flexion for >30 seconds does not occur\par \par Tinel’s Sign (R/L): neg /neg\par Ferrer Test (R/L): neg /neg (elbow extension, wrist pronation and flexion)\par Maudley’s Test (R/L): neg /neg (resisted middle digit extension)\par Golfer’s elbow Test (R/L): neg /neg (resisted elbow and wrist extension)\par Pain with passive extension of wrist and fingers (R/L): neg /neg \par Vasculature: 2+ radial pulse bilaterally \par UE Skin (R/L): no rashes or lesions bilaterally \par DTR UE (R/L): Biceps (2+/2+), Triceps (2+/2+) \par \par Knee Exam:\par \par General Appearance: Well-nourished, well developed in no acute distress \par Orientation: Oriented to person, place and time.\par \par Extremities: 2+ pulses distally, no edema, no cyanosis or clubbing noted. \par Gait: normal Coordination: normal \par Sensation: Subjective normal distal sensation bilaterally LE \par Skin: no rashes or lesions bilaterally\par \par Right knee: Appearance - Normal appearance, no crepitus noted. \par ROM - flexion 150 deg., extension 0 deg. \par Palpation - No effusion or baker’s cyst appreciated. No tenderness noted along medial joint line, lateral joint line, patellar tendon, quad tendon \par Flexion Strength - 5/5 strength\par Extension Strength – 5/5\par Hip Abduction Strength – 5/5\par Hip Adduction Strength -5/5\par Dorsiflexion Strength – 5/5\par Plantar Flexion Strength - 5/5\par Valgus Stress Test: Negative\par Varus Stress Test: Negative\par Lachman Test: negative\par Anterior Draw Test: Negative\par Posterior Draw\par Lever Test: Negative\par Patellar Apprehension Test: Negative\par David sign: Negative\par Елена’s Test: Negative\par Thessaly Test: Negative \par \par Left knee: Appearance - Mild swelling at left knee as comapared to right, no crepitus noted. \par ROM - flexion 150 deg., extension 0 deg. \par Palpation - No effusion or baker’s cyst appreciated. No tenderness noted along medial joint line, lateral joint line, patellar tendon, quad tendon \par Flexion Strength - 5/5 strength\par Extension Strength – 5/5\par Hip Abduction Strength – 5/5\par Hip Adduction Strength -5/5\par Dorsiflexion Strength – 5/5\par Plantar Flexion Strength - 5/5\par Lachman Test: negative\par Anterior Draw Test: Negative\par Lever Test: Negative\par Patellar Apprehension Test: Negative\par David sign: Negative\par Елена’s Test: Negative\par Thessaly Test: Negative \par \par  [de-identified] : X-rays of bilateral knees: \par \par Severe patellofemoral osteoarthritis appreciated bilaterally with left worse than the right, with some signs of mild sclerosis and osteophytes seen in bilateral knees with preserved joint space and no medial compartment collapse.  No fracture or dislocation noted, moderate OA appreciated globally bilaterally.\par \par X-ray of the left elbow:\par \par Severe global OA with multiple osteophytes noted, including on the coronoid process as well as the distal posterior humerus, no fracture or dislocation seen.  No significant posterior fat pad appreciated.

## 2022-11-23 NOTE — PROCEDURE
[de-identified] : Aspiration of left knee Baker's cyst:\par \par Following preparation and sanitization of the area with alcohol and iodine swabs, area was anesthetized with ethyl chloride spray as well as approximately 3 cc of 1% lidocaine without epi.  With satisfactory anesthesia achieved.  Under ultrasound guidance, a 22-gauge needle was inserted into visualized Baker's cyst on ultrasound during which 15 cc of serous fluid was drained, and once drained, cavity was filled with an additional 3 cc of 1% lidocaine without epi mixed in with approximately .25 cc of Depo-Medrol.  Patient tolerated the procedure well with no blood loss.\par \par Intra-articular injection of left knee:\par \par Following preparation and sanitization of the area with alcohol, area was anesthetized with ethyl chloride, afterwards which 7 cc of lidocaine 1% without epi mixed with 0.75 cc of Depo-Medrol as well as Synvisc 2 mL (lot number [id number], expiration 4/30/2023) was injected into the knee joint.  Patient tolerated the procedure well without any issues.

## 2022-11-23 NOTE — PROCEDURE
[de-identified] : Aspiration of left knee Baker's cyst:\par \par Following preparation and sanitization of the area with alcohol and iodine swabs, area was anesthetized with ethyl chloride spray as well as approximately 3 cc of 1% lidocaine without epi.  With satisfactory anesthesia achieved.  Under ultrasound guidance, a 22-gauge needle was inserted into visualized Baker's cyst on ultrasound during which 15 cc of serous fluid was drained, and once drained, cavity was filled with an additional 3 cc of 1% lidocaine without epi mixed in with approximately .25 cc of Depo-Medrol.  Patient tolerated the procedure well with no blood loss.\par \par Intra-articular injection of left knee:\par \par Following preparation and sanitization of the area with alcohol, area was anesthetized with ethyl chloride, afterwards which 7 cc of lidocaine 1% without epi mixed with 0.75 cc of Depo-Medrol as well as Synvisc 2 mL (lot number [id number], expiration 4/30/2023) was injected into the knee joint.  Patient tolerated the procedure well without any issues.

## 2022-11-23 NOTE — HISTORY OF PRESENT ILLNESS
[de-identified] : 67-year-old male with a past medical history of BPH, high cholesterol, insulin-dependent diabetes presenting with left knee pain for the past 6 months.  Patient cannot recall an inciting event but reports general swelling, stiffness, waxing and waning pain in the left knee, made worse with exertion, denies any mechanical obstruction of flexing his left knee, has not taken anything for pain.  Cannot recall any significant trauma to the area.  Denies any numbness, tingling, paresthesias.  Patient reports that he also suffers from chronic left elbow stiffness, has been told in the past that he has osteoarthritis in the area.  Reports pain with flexing his elbow.  States he plays golf regularly.

## 2022-11-23 NOTE — DISCUSSION/SUMMARY
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  Both steroids and hyaluronic acid were injected in the patient's left knee following Baker's cyst aspiration.  Patient reported significant improvement in pain.  Given that patient is on Plavix, NSAIDs were not prescribed, patient was encouraged to use Tylenol as needed for pain.  Physical therapy prescription was provided, patient was advised to return in 2 weeks for reevaluation of his pain.\par \par Secondary diagnosis -left elbow OA\par \par Patient with long-term OA in appearance and on clinical exam, will add onto prescription for PT for patient along with bilateral knees, will reevaluate in 2 weeks following physical therapy.\par \par Attending Attestation:\par \par Seen with Dr. Robles.\par I saw and evaluated the patient and was involved with and agree with the fellow's history, physical exam, and I personally documented the assessment and plan of care.\par \par ASSESSMENT: \par \par Patient with history of insulin-dependent diabetes (with continuous glucose monitor), hyperlipidemia, PUD, currently taking Plavix who is left-hand dominant, retired but plays golf and takes care of grandkids with:\par \par 1. Left chronic elbow pain with decreased range of motion in extension, atraumatic, no neurologic symptoms. Associated with stiffness, with decreased range of motion approximately 10 degrees of extensor lag. Concerning for osteoarthritis. X-ray with evidence of moderate osteoarthritis. Would benefit from PT, OTC analgesics, and range of motion exercises. If not improved, consider corticosteroid injection versus referral for operative intervention. \par \par 2. Left knee pain, chronic, atraumatic. Concern for osteoarthritis was significantly affecting the patellofemoral joint, likely as a result of shallow trochlea. There is a osseous intra-articular body noted suprapatellar recess.  Imaging concerning for severe osteoarthritis in the patellofemoral joint, with mild osteoarthritis in the medial and lateral compartments. Patient with Baker's cyst on MSK ultrasound, would benefit from aspiration given the size and patient's symptoms over the posterior knee. Consider corticosteroid injection as well as HA injection. Prolonged discussion had with patient regarding importance of frequents glucose checks and increase hydration and appropriate insulin use given the risk of hyperglycemia with steroids. Will prescribe patient with PT, have patient take OTC analgesics, and perform HEP. If not improving after injections, consider referral for operative intervention with arthroplasty.\par \par Will treat symptoms with conservative management with activity modification, analgesic medications, HEP/PT, and re-evaluate the patient to see if they would benefit from further diagnostic testing, or referral for injection therapy or surgical intervention.\par \par Clinical and radiographic findings discussed. Diagnosis, prognosis and treatment options reviewed. Patient verbalizes understanding and all questions answered.\par The patient adamant at this time about not having any type of surgical intervention. Wants only to continue with non-operative management. \par \par Patient adamant at this time about not having any type of Sx intervention. Wants only to continue with conservative approach.\par \par PLAN:\par Additional Testing: X-ray of elbow and knee\par Further Intervention: Patient would like to continue with non-interventional measures as well as injection therapy\par Weight-bearing Status: WBAT\par Assistive Devices: None\par Therapy: HEP recommended and reviewed, PT prescribed\par Health Management: BMI/Weight Management and physical activity level reviewed with the patient\par Home Modalities: RICE protocol for pain/swelling and home modalities reviewed with the patient\par Medications: OTC analgesics and avoid NSAIDs as patient on Plavix\par Orthotics: None\par Work Status: Retired\par Activity Status: Avoid aggravating and high impact activities\par Sports/Gym Status: No gym or sports until cleared medically\par Follow-up: 1 week for [place]\par  \par Alex Trinidad MD.

## 2022-11-23 NOTE — PHYSICAL EXAM
[de-identified] : Elbow Exam (Bilateral)\par \par General Appearance: Well-nourished, well developed in no acute distress \par Orientation: Oriented to person, place and time. \par Gait: normal Non-labored breathing, no stridor \par C spine: full painless ROM, no midline spinal tenderness \par \par Inspection/Palpation \par UE (R/L): TTP over both cubital tunnels, and olecranon, \par No thenar or hypothenar atrophy \par Elbow ROM (R/L): \par Flexion 0-140/ 0-140, \par Supination (R/L): 80/80, \par Pronation (R/L): 80/80 (with pain) \par Elbow Stability (R/L): no varus or valgus laxity bilaterally \par Elbow Flexion Strength (R/L): 5/5 / 5/5\par Elbow Extension Strength (R/L): 5/5 / 5/5 \par Wrist Extension Strength (R/L): 5/5 / 5/5\par Wrist Flexion Strength (R/L): 5/5 / 5/5 \par Intrinsics of Hand Strength (R/L): 5/5 / 5/5 \par Sensation: Subjective normal median, ulnar, radial and axillary sensation bilaterally\par Ulnar distribution numbness with elbow flexion for >30 seconds does not occur\par \par Tinel’s Sign (R/L): neg /neg\par Ferrer Test (R/L): neg /neg (elbow extension, wrist pronation and flexion)\par Maudley’s Test (R/L): neg /neg (resisted middle digit extension)\par Golfer’s elbow Test (R/L): neg /neg (resisted elbow and wrist extension)\par Pain with passive extension of wrist and fingers (R/L): neg /neg \par Vasculature: 2+ radial pulse bilaterally \par UE Skin (R/L): no rashes or lesions bilaterally \par DTR UE (R/L): Biceps (2+/2+), Triceps (2+/2+) \par \par Knee Exam:\par \par General Appearance: Well-nourished, well developed in no acute distress \par Orientation: Oriented to person, place and time.\par \par Extremities: 2+ pulses distally, no edema, no cyanosis or clubbing noted. \par Gait: normal Coordination: normal \par Sensation: Subjective normal distal sensation bilaterally LE \par Skin: no rashes or lesions bilaterally\par \par Right knee: Appearance - Normal appearance, no crepitus noted. \par ROM - flexion 150 deg., extension 0 deg. \par Palpation - No effusion or baker’s cyst appreciated. No tenderness noted along medial joint line, lateral joint line, patellar tendon, quad tendon \par Flexion Strength - 5/5 strength\par Extension Strength – 5/5\par Hip Abduction Strength – 5/5\par Hip Adduction Strength -5/5\par Dorsiflexion Strength – 5/5\par Plantar Flexion Strength - 5/5\par Valgus Stress Test: Negative\par Varus Stress Test: Negative\par Lachman Test: negative\par Anterior Draw Test: Negative\par Posterior Draw\par Lever Test: Negative\par Patellar Apprehension Test: Negative\par David sign: Negative\par Елена’s Test: Negative\par Thessaly Test: Negative \par \par Left knee: Appearance - Mild swelling at left knee as comapared to right, no crepitus noted. \par ROM - flexion 150 deg., extension 0 deg. \par Palpation - No effusion or baker’s cyst appreciated. No tenderness noted along medial joint line, lateral joint line, patellar tendon, quad tendon \par Flexion Strength - 5/5 strength\par Extension Strength – 5/5\par Hip Abduction Strength – 5/5\par Hip Adduction Strength -5/5\par Dorsiflexion Strength – 5/5\par Plantar Flexion Strength - 5/5\par Lachman Test: negative\par Anterior Draw Test: Negative\par Lever Test: Negative\par Patellar Apprehension Test: Negative\par David sign: Negative\par Елена’s Test: Negative\par Thessaly Test: Negative \par \par  [de-identified] : X-rays of bilateral knees: \par \par Severe patellofemoral osteoarthritis appreciated bilaterally with left worse than the right, with some signs of mild sclerosis and osteophytes seen in bilateral knees with preserved joint space and no medial compartment collapse.  No fracture or dislocation noted, moderate OA appreciated globally bilaterally.\par \par X-ray of the left elbow:\par \par Severe global OA with multiple osteophytes noted, including on the coronoid process as well as the distal posterior humerus, no fracture or dislocation seen.  No significant posterior fat pad appreciated.

## 2022-11-23 NOTE — DISCUSSION/SUMMARY
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  Both steroids and hyaluronic acid were injected in the patient's left knee following Baker's cyst aspiration.  Patient reported significant improvement in pain.  Given that patient is on Plavix, NSAIDs were not prescribed, patient was encouraged to use Tylenol as needed for pain.  Physical therapy prescription was provided, patient was advised to return in 2 weeks for reevaluation of his pain.\par \par Secondary diagnosis -left elbow OA\par \par Patient with long-term OA in appearance and on clinical exam, will add onto prescription for PT for patient along with bilateral knees, will reevaluate in 2 weeks following physical therapy.\par \par Attending Attestation:\par \par Seen with Dr. Robles.\par I saw and evaluated the patient and was involved with and agree with the fellow's history, physical exam, and I personally documented the assessment and plan of care.\par \par ASSESSMENT: \par \par Patient with history of insulin-dependent diabetes (with continuous glucose monitor), hyperlipidemia, PUD, currently taking Plavix who is left-hand dominant, retired but plays golf and takes care of grandkids with:\par \par 1. Left chronic elbow pain with decreased range of motion in extension, atraumatic, no neurologic symptoms. Associated with stiffness, with decreased range of motion approximately 10 degrees of extensor lag. Concerning for osteoarthritis. X-ray with evidence of moderate osteoarthritis. Would benefit from PT, OTC analgesics, and range of motion exercises. If not improved, consider corticosteroid injection versus referral for operative intervention. \par \par 2. Left knee pain, chronic, atraumatic. Concern for osteoarthritis was significantly affecting the patellofemoral joint, likely as a result of shallow trochlea. There is a osseous intra-articular body noted suprapatellar recess.  Imaging concerning for severe osteoarthritis in the patellofemoral joint, with mild osteoarthritis in the medial and lateral compartments. Patient with Baker's cyst on MSK ultrasound, would benefit from aspiration given the size and patient's symptoms over the posterior knee. Consider corticosteroid injection as well as HA injection. Prolonged discussion had with patient regarding importance of frequents glucose checks and increase hydration and appropriate insulin use given the risk of hyperglycemia with steroids. Will prescribe patient with PT, have patient take OTC analgesics, and perform HEP. If not improving after injections, consider referral for operative intervention with arthroplasty.\par \par Will treat symptoms with conservative management with activity modification, analgesic medications, HEP/PT, and re-evaluate the patient to see if they would benefit from further diagnostic testing, or referral for injection therapy or surgical intervention.\par \par Clinical and radiographic findings discussed. Diagnosis, prognosis and treatment options reviewed. Patient verbalizes understanding and all questions answered.\par The patient adamant at this time about not having any type of surgical intervention. Wants only to continue with non-operative management. \par \par Patient adamant at this time about not having any type of Sx intervention. Wants only to continue with conservative approach.\par \par PLAN:\par Additional Testing: X-ray of elbow and knee\par Further Intervention: Patient would like to continue with non-interventional measures as well as injection therapy\par Weight-bearing Status: WBAT\par Assistive Devices: None\par Therapy: HEP recommended and reviewed, PT prescribed\par Health Management: BMI/Weight Management and physical activity level reviewed with the patient\par Home Modalities: RICE protocol for pain/swelling and home modalities reviewed with the patient\par Medications: OTC analgesics and avoid NSAIDs as patient on Plavix\par Orthotics: None\par Work Status: Retired\par Activity Status: Avoid aggravating and high impact activities\par Sports/Gym Status: No gym or sports until cleared medically\par Follow-up: 1 week for [place]\par  \par Alex Trinidad MD.

## 2022-11-23 NOTE — HISTORY OF PRESENT ILLNESS
[de-identified] : 67-year-old male with a past medical history of BPH, high cholesterol, insulin-dependent diabetes presenting with left knee pain for the past 6 months.  Patient cannot recall an inciting event but reports general swelling, stiffness, waxing and waning pain in the left knee, made worse with exertion, denies any mechanical obstruction of flexing his left knee, has not taken anything for pain.  Cannot recall any significant trauma to the area.  Denies any numbness, tingling, paresthesias.  Patient reports that he also suffers from chronic left elbow stiffness, has been told in the past that he has osteoarthritis in the area.  Reports pain with flexing his elbow.  States he plays golf regularly.

## 2022-11-23 NOTE — PHYSICAL EXAM
[de-identified] : Elbow Exam (Bilateral)\par \par General Appearance: Well-nourished, well developed in no acute distress \par Orientation: Oriented to person, place and time. \par Gait: normal Non-labored breathing, no stridor \par C spine: full painless ROM, no midline spinal tenderness \par \par Inspection/Palpation \par UE (R/L): TTP over both cubital tunnels, and olecranon, \par No thenar or hypothenar atrophy \par Elbow ROM (R/L): \par Flexion 0-140/ 0-140, \par Supination (R/L): 80/80, \par Pronation (R/L): 80/80 (with pain) \par Elbow Stability (R/L): no varus or valgus laxity bilaterally \par Elbow Flexion Strength (R/L): 5/5 / 5/5\par Elbow Extension Strength (R/L): 5/5 / 5/5 \par Wrist Extension Strength (R/L): 5/5 / 5/5\par Wrist Flexion Strength (R/L): 5/5 / 5/5 \par Intrinsics of Hand Strength (R/L): 5/5 / 5/5 \par Sensation: Subjective normal median, ulnar, radial and axillary sensation bilaterally\par Ulnar distribution numbness with elbow flexion for >30 seconds does not occur\par \par Tinel’s Sign (R/L): neg /neg\par Ferrer Test (R/L): neg /neg (elbow extension, wrist pronation and flexion)\par Maudley’s Test (R/L): neg /neg (resisted middle digit extension)\par Golfer’s elbow Test (R/L): neg /neg (resisted elbow and wrist extension)\par Pain with passive extension of wrist and fingers (R/L): neg /neg \par Vasculature: 2+ radial pulse bilaterally \par UE Skin (R/L): no rashes or lesions bilaterally \par DTR UE (R/L): Biceps (2+/2+), Triceps (2+/2+) \par \par Knee Exam:\par \par General Appearance: Well-nourished, well developed in no acute distress \par Orientation: Oriented to person, place and time.\par \par Extremities: 2+ pulses distally, no edema, no cyanosis or clubbing noted. \par Gait: normal Coordination: normal \par Sensation: Subjective normal distal sensation bilaterally LE \par Skin: no rashes or lesions bilaterally\par \par Right knee: Appearance - Normal appearance, no crepitus noted. \par ROM - flexion 150 deg., extension 0 deg. \par Palpation - No effusion or baker’s cyst appreciated. No tenderness noted along medial joint line, lateral joint line, patellar tendon, quad tendon \par Flexion Strength - 5/5 strength\par Extension Strength – 5/5\par Hip Abduction Strength – 5/5\par Hip Adduction Strength -5/5\par Dorsiflexion Strength – 5/5\par Plantar Flexion Strength - 5/5\par Valgus Stress Test: Negative\par Varus Stress Test: Negative\par Lachman Test: negative\par Anterior Draw Test: Negative\par Posterior Draw\par Lever Test: Negative\par Patellar Apprehension Test: Negative\par David sign: Negative\par Елена’s Test: Negative\par Thessaly Test: Negative \par \par Left knee: Appearance - Mild swelling at left knee as comapared to right, no crepitus noted. \par ROM - flexion 150 deg., extension 0 deg. \par Palpation - No effusion or baker’s cyst appreciated. No tenderness noted along medial joint line, lateral joint line, patellar tendon, quad tendon \par Flexion Strength - 5/5 strength\par Extension Strength – 5/5\par Hip Abduction Strength – 5/5\par Hip Adduction Strength -5/5\par Dorsiflexion Strength – 5/5\par Plantar Flexion Strength - 5/5\par Lachman Test: negative\par Anterior Draw Test: Negative\par Lever Test: Negative\par Patellar Apprehension Test: Negative\par David sign: Negative\par Елена’s Test: Negative\par Thessaly Test: Negative \par \par  [de-identified] : X-rays of bilateral knees: \par \par Severe patellofemoral osteoarthritis appreciated bilaterally with left worse than the right, with some signs of mild sclerosis and osteophytes seen in bilateral knees with preserved joint space and no medial compartment collapse.  No fracture or dislocation noted, moderate OA appreciated globally bilaterally.\par \par X-ray of the left elbow:\par \par Severe global OA with multiple osteophytes noted, including on the coronoid process as well as the distal posterior humerus, no fracture or dislocation seen.  No significant posterior fat pad appreciated.

## 2022-11-23 NOTE — PROCEDURE
[de-identified] : Aspiration of left knee Baker's cyst:\par \par Following preparation and sanitization of the area with alcohol and iodine swabs, area was anesthetized with ethyl chloride spray as well as approximately 3 cc of 1% lidocaine without epi.  With satisfactory anesthesia achieved.  Under ultrasound guidance, a 22-gauge needle was inserted into visualized Baker's cyst on ultrasound during which 15 cc of serous fluid was drained, and once drained, cavity was filled with an additional 3 cc of 1% lidocaine without epi mixed in with approximately .25 cc of Depo-Medrol.  Patient tolerated the procedure well with no blood loss.\par \par Intra-articular injection of left knee:\par \par Following preparation and sanitization of the area with alcohol, area was anesthetized with ethyl chloride, afterwards which 7 cc of lidocaine 1% without epi mixed with 0.75 cc of Depo-Medrol as well as Synvisc 2 mL (lot number [id number], expiration 4/30/2023) was injected into the knee joint.  Patient tolerated the procedure well without any issues.

## 2022-11-23 NOTE — PROCEDURE
[de-identified] : Aspiration of left knee Baker's cyst:\par \par Following preparation and sanitization of the area with alcohol and iodine swabs, area was anesthetized with ethyl chloride spray as well as approximately 3 cc of 1% lidocaine without epi.  With satisfactory anesthesia achieved.  Under ultrasound guidance, a 22-gauge needle was inserted into visualized Baker's cyst on ultrasound during which 15 cc of serous fluid was drained, and once drained, cavity was filled with an additional 3 cc of 1% lidocaine without epi mixed in with approximately .25 cc of Depo-Medrol.  Patient tolerated the procedure well with no blood loss.\par \par Intra-articular injection of left knee:\par \par Following preparation and sanitization of the area with alcohol, area was anesthetized with ethyl chloride, afterwards which 7 cc of lidocaine 1% without epi mixed with 0.75 cc of Depo-Medrol as well as Synvisc 2 mL (lot number [id number], expiration 4/30/2023) was injected into the knee joint.  Patient tolerated the procedure well without any issues.

## 2022-11-23 NOTE — HISTORY OF PRESENT ILLNESS
[de-identified] : 67-year-old male with a past medical history of BPH, high cholesterol, insulin-dependent diabetes presenting with left knee pain for the past 6 months.  Patient cannot recall an inciting event but reports general swelling, stiffness, waxing and waning pain in the left knee, made worse with exertion, denies any mechanical obstruction of flexing his left knee, has not taken anything for pain.  Cannot recall any significant trauma to the area.  Denies any numbness, tingling, paresthesias.  Patient reports that he also suffers from chronic left elbow stiffness, has been told in the past that he has osteoarthritis in the area.  Reports pain with flexing his elbow.  States he plays golf regularly.

## 2022-11-23 NOTE — HISTORY OF PRESENT ILLNESS
[de-identified] : 67-year-old male with a past medical history of BPH, high cholesterol, insulin-dependent diabetes presenting with left knee pain for the past 6 months.  Patient cannot recall an inciting event but reports general swelling, stiffness, waxing and waning pain in the left knee, made worse with exertion, denies any mechanical obstruction of flexing his left knee, has not taken anything for pain.  Cannot recall any significant trauma to the area.  Denies any numbness, tingling, paresthesias.  Patient reports that he also suffers from chronic left elbow stiffness, has been told in the past that he has osteoarthritis in the area.  Reports pain with flexing his elbow.  States he plays golf regularly.

## 2022-11-23 NOTE — DISCUSSION/SUMMARY
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  Both steroids and hyaluronic acid were injected in the patient's left knee following Baker's cyst aspiration.  Patient reported significant improvement in pain.  Given that patient is on Plavix, NSAIDs were not prescribed, patient was encouraged to use Tylenol as needed for pain.  Physical therapy prescription was provided, patient was advised to return in 2 weeks for reevaluation of his pain.\par \par Secondary diagnosis -left elbow OA\par \par Patient with long-term OA in appearance and on clinical exam, will add onto prescription for PT for patient along with bilateral knees, will reevaluate in 2 weeks following physical therapy.\par \par Attending Attestation:\par \par Seen with Dr. Robles.\par I saw and evaluated the patient and was involved with and agree with the fellow's history, physical exam, and I personally documented the assessment and plan of care.\par \par ASSESSMENT: \par \par Patient with history of insulin-dependent diabetes (with continuous glucose monitor), hyperlipidemia, PUD, currently taking Plavix who is left-hand dominant, retired but plays golf and takes care of grandkids with:\par \par 1. Left chronic elbow pain with decreased range of motion in extension, atraumatic, no neurologic symptoms. Associated with stiffness, with decreased range of motion approximately 10 degrees of extensor lag. Concerning for osteoarthritis. X-ray with evidence of moderate osteoarthritis. Would benefit from PT, OTC analgesics, and range of motion exercises. If not improved, consider corticosteroid injection versus referral for operative intervention. \par \par 2. Left knee pain, chronic, atraumatic. Concern for osteoarthritis was significantly affecting the patellofemoral joint, likely as a result of shallow trochlea. There is a osseous intra-articular body noted suprapatellar recess.  Imaging concerning for severe osteoarthritis in the patellofemoral joint, with mild osteoarthritis in the medial and lateral compartments. Patient with Baker's cyst on MSK ultrasound, would benefit from aspiration given the size and patient's symptoms over the posterior knee. Consider corticosteroid injection as well as HA injection. Prolonged discussion had with patient regarding importance of frequents glucose checks and increase hydration and appropriate insulin use given the risk of hyperglycemia with steroids. Will prescribe patient with PT, have patient take OTC analgesics, and perform HEP. If not improving after injections, consider referral for operative intervention with arthroplasty.\par \par Will treat symptoms with conservative management with activity modification, analgesic medications, HEP/PT, and re-evaluate the patient to see if they would benefit from further diagnostic testing, or referral for injection therapy or surgical intervention.\par \par Clinical and radiographic findings discussed. Diagnosis, prognosis and treatment options reviewed. Patient verbalizes understanding and all questions answered.\par The patient adamant at this time about not having any type of surgical intervention. Wants only to continue with non-operative management. \par \par Patient adamant at this time about not having any type of Sx intervention. Wants only to continue with conservative approach.\par \par PLAN:\par Additional Testing: X-ray of elbow and knee\par Further Intervention: Patient would like to continue with non-interventional measures as well as injection therapy\par Weight-bearing Status: WBAT\par Assistive Devices: None\par Therapy: HEP recommended and reviewed, PT prescribed\par Health Management: BMI/Weight Management and physical activity level reviewed with the patient\par Home Modalities: RICE protocol for pain/swelling and home modalities reviewed with the patient\par Medications: OTC analgesics and avoid NSAIDs as patient on Plavix\par Orthotics: None\par Work Status: Retired\par Activity Status: Avoid aggravating and high impact activities\par Sports/Gym Status: No gym or sports until cleared medically\par Follow-up: 1 week for [place]\par  \par Alex Trinidad MD.

## 2022-11-23 NOTE — PHYSICAL EXAM
[de-identified] : Elbow Exam (Bilateral)\par \par General Appearance: Well-nourished, well developed in no acute distress \par Orientation: Oriented to person, place and time. \par Gait: normal Non-labored breathing, no stridor \par C spine: full painless ROM, no midline spinal tenderness \par \par Inspection/Palpation \par UE (R/L): TTP over both cubital tunnels, and olecranon, \par No thenar or hypothenar atrophy \par Elbow ROM (R/L): \par Flexion 0-140/ 0-140, \par Supination (R/L): 80/80, \par Pronation (R/L): 80/80 (with pain) \par Elbow Stability (R/L): no varus or valgus laxity bilaterally \par Elbow Flexion Strength (R/L): 5/5 / 5/5\par Elbow Extension Strength (R/L): 5/5 / 5/5 \par Wrist Extension Strength (R/L): 5/5 / 5/5\par Wrist Flexion Strength (R/L): 5/5 / 5/5 \par Intrinsics of Hand Strength (R/L): 5/5 / 5/5 \par Sensation: Subjective normal median, ulnar, radial and axillary sensation bilaterally\par Ulnar distribution numbness with elbow flexion for >30 seconds does not occur\par \par Tinel’s Sign (R/L): neg /neg\par Ferrer Test (R/L): neg /neg (elbow extension, wrist pronation and flexion)\par Maudley’s Test (R/L): neg /neg (resisted middle digit extension)\par Golfer’s elbow Test (R/L): neg /neg (resisted elbow and wrist extension)\par Pain with passive extension of wrist and fingers (R/L): neg /neg \par Vasculature: 2+ radial pulse bilaterally \par UE Skin (R/L): no rashes or lesions bilaterally \par DTR UE (R/L): Biceps (2+/2+), Triceps (2+/2+) \par \par Knee Exam:\par \par General Appearance: Well-nourished, well developed in no acute distress \par Orientation: Oriented to person, place and time.\par \par Extremities: 2+ pulses distally, no edema, no cyanosis or clubbing noted. \par Gait: normal Coordination: normal \par Sensation: Subjective normal distal sensation bilaterally LE \par Skin: no rashes or lesions bilaterally\par \par Right knee: Appearance - Normal appearance, no crepitus noted. \par ROM - flexion 150 deg., extension 0 deg. \par Palpation - No effusion or baker’s cyst appreciated. No tenderness noted along medial joint line, lateral joint line, patellar tendon, quad tendon \par Flexion Strength - 5/5 strength\par Extension Strength – 5/5\par Hip Abduction Strength – 5/5\par Hip Adduction Strength -5/5\par Dorsiflexion Strength – 5/5\par Plantar Flexion Strength - 5/5\par Valgus Stress Test: Negative\par Varus Stress Test: Negative\par Lachman Test: negative\par Anterior Draw Test: Negative\par Posterior Draw\par Lever Test: Negative\par Patellar Apprehension Test: Negative\par David sign: Negative\par Елена’s Test: Negative\par Thessaly Test: Negative \par \par Left knee: Appearance - Mild swelling at left knee as comapared to right, no crepitus noted. \par ROM - flexion 150 deg., extension 0 deg. \par Palpation - No effusion or baker’s cyst appreciated. No tenderness noted along medial joint line, lateral joint line, patellar tendon, quad tendon \par Flexion Strength - 5/5 strength\par Extension Strength – 5/5\par Hip Abduction Strength – 5/5\par Hip Adduction Strength -5/5\par Dorsiflexion Strength – 5/5\par Plantar Flexion Strength - 5/5\par Lachman Test: negative\par Anterior Draw Test: Negative\par Lever Test: Negative\par Patellar Apprehension Test: Negative\par David sign: Negative\par Елена’s Test: Negative\par Thessaly Test: Negative \par \par  [de-identified] : X-rays of bilateral knees: \par \par Severe patellofemoral osteoarthritis appreciated bilaterally with left worse than the right, with some signs of mild sclerosis and osteophytes seen in bilateral knees with preserved joint space and no medial compartment collapse.  No fracture or dislocation noted, moderate OA appreciated globally bilaterally.\par \par X-ray of the left elbow:\par \par Severe global OA with multiple osteophytes noted, including on the coronoid process as well as the distal posterior humerus, no fracture or dislocation seen.  No significant posterior fat pad appreciated.

## 2022-11-29 ENCOUNTER — APPOINTMENT (OUTPATIENT)
Dept: SPORTS MEDICINE | Facility: CLINIC | Age: 67
End: 2022-11-29

## 2022-11-29 PROCEDURE — 99213 OFFICE O/P EST LOW 20 MIN: CPT | Mod: 25

## 2022-11-29 PROCEDURE — 20610 DRAIN/INJ JOINT/BURSA W/O US: CPT

## 2022-11-29 NOTE — PROCEDURE
[de-identified] : Aspiration of left knee Baker's cyst:\par \par Following preparation and sanitization of the area with alcohol and iodine swabs, area was anesthetized with ethyl chloride spray as well as approximately 3 cc of 1% lidocaine without epi.  With satisfactory anesthesia achieved.  Under ultrasound guidance, a 22-gauge needle was inserted into visualized Baker's cyst on ultrasound during which 15 cc of serous fluid was drained, and once drained, cavity was filled with an additional 3 cc of 1% lidocaine without epi mixed in with approximately .25 cc of Depo-Medrol.  Patient tolerated the procedure well with no blood loss.\par \par Intra-articular injection of left knee:\par \par Following preparation and sanitization of the area with alcohol, area was anesthetized with ethyl chloride, afterwards which 7 cc of lidocaine 1% without epi mixed with 0.75 cc of Depo-Medrol as well as Synvisc 2 mL (lot number [id number], expiration 4/30/2023) was injected into the knee joint.  Patient tolerated the procedure well without any issues.

## 2022-11-29 NOTE — DISCUSSION/SUMMARY
[de-identified] : Impression: Severe patellofemoral OA \par \par Patient reported significant improvement in pain.  Given that patient is on Plavix, NSAIDs were not prescribed, patient was encouraged to use Tylenol as needed for pain.  Physical therapy prescription was provided, patient was advised to return in 1 weeks for reevaluation of his pain and final injection of HA.\par \par \par Attending Attestation:\par \par Seen with Dr. Robles.\par I saw and evaluated the patient and was involved with and agree with the fellow's history, physical exam, and I personally documented the assessment and plan of care.\par \par ASSESSMENT: \par \par Patient with history of insulin-dependent diabetes (with continuous glucose monitor), hyperlipidemia, PUD, currently taking Plavix who is left-hand dominant, retired but plays golf and takes care of grandkids with:\par \par 1. Left chronic elbow pain with decreased range of motion in extension, atraumatic, no neurologic symptoms. Associated with stiffness, with decreased range of motion approximately 10 degrees of extensor lag. Concerning for osteoarthritis. X-ray with evidence of moderate osteoarthritis. Would benefit from PT, OTC analgesics, and range of motion exercises.  Still pending patient to start PT.  If not improved, consider corticosteroid injection versus referral for operative intervention. \par \par 2. Left knee pain, chronic, atraumatic. Concern for osteoarthritis was significantly affecting the patellofemoral joint, likely as a result of shallow trochlea. There is a osseous intra-articular body noted suprapatellar recess.  Imaging concerning for severe osteoarthritis in the patellofemoral joint, with mild osteoarthritis in the medial and lateral compartments. Patient with Baker's cyst on MS[facility] ultrasound, would benefit from aspiration given the size and patient's symptoms over the posterior knee.  Due to hypoglycemic episodes with previous CSI, prefer HA and PRP in the future. Will prescribe patient with PT, have patient take OTC analgesics, and perform HEP. If not improving after injections, consider referral for operative intervention with arthroplasty.\par \par -> 11/22/22: Left knee, aspirated Baker's cyst, injection of corticosteroid to Baker's cyst and knee joint, injection of Synvisc #1\par -> 11/29/22: Left knee, injection of Synvisc #2\par \par Will treat symptoms with conservative management with activity modification, analgesic medications, HEP/PT, and re-evaluate the patient to see if they would benefit from further diagnostic testing, or referral for injection therapy or surgical intervention.\par \par Clinical and radiographic findings discussed. Diagnosis, prognosis and treatment options reviewed. Patient verbalizes understanding and all questions answered.\par The patient adamant at this time about not having any type of surgical intervention. Wants only to continue with non-operative management. \par \par Patient adamant at this time about not having any type of Sx intervention. Wants only to continue with conservative approach.\par \par PLAN:\par Additional Testing: None\par Further Intervention: Patient would like to continue with non-interventional measures as well as injection therapy\par Weight-bearing Status: WBAT\par Assistive Devices: None\par Therapy: HEP recommended and reviewed, PT prescribed\par Health Management: BMI/Weight Management and physical activity level reviewed with the patient\par Home Modalities: RICE protocol for pain/swelling and home modalities reviewed with the patient\par Medications: OTC analgesics and avoid NSAIDs as patient on Plavix\par Orthotics: None\par Work Status: Retired\par Activity Status: Avoid aggravating and high impact activities\par Sports/Gym Status: No gym or sports until cleared medically\par Follow-up: 1 week for [place]\par  \par Alex Trinidad MD.

## 2022-11-29 NOTE — HISTORY OF PRESENT ILLNESS
[de-identified] : 67-year-old male with a past medical history of BPH, high cholesterol, insulin-dependent diabetes presenting with left knee pain and repeat gel injection following first HA injection last week. Had steroid injection in addition last week, and reports significant improvement since last week.  However, blood sugars elevated up to 300s, requiring increased insulin use, hypoglycemia resolved in 2 to 3 days without any complications.  Denies any numbness, tingling, paresthesias, swelling, fever, chills. Reports imporvement in activities.

## 2022-11-29 NOTE — PHYSICAL EXAM
[de-identified] : Knee Exam:\par \par General Appearance: Well-nourished, well developed in no acute distress \par Orientation: Oriented to person, place and time.\par \par Extremities: 2+ pulses distally, no edema, no cyanosis or clubbing noted. \par Gait: normal Coordination: normal \par Sensation: Subjective normal distal sensation bilaterally LE \par Skin: no rashes or lesions bilaterally\par \par Right knee: Appearance - Normal appearance, no crepitus noted. \par ROM - flexion 150 deg., extension 0 deg. \par Palpation - No effusion or baker’s cyst appreciated. No tenderness noted along medial joint line, lateral joint line, patellar tendon, quad tendon \par Flexion Strength - 5/5 strength\par Extension Strength – 5/5\par Hip Abduction Strength – 5/5\par Hip Adduction Strength -5/5\par Dorsiflexion Strength – 5/5\par Plantar Flexion Strength - 5/5\par Valgus Stress Test: Negative\par Varus Stress Test: Negative\par Lachman Test: negative\par Anterior Draw Test: Negative\par Posterior Draw\par Lever Test: Negative\par Patellar Apprehension Test: Negative\par David sign: Negative\par Елена’s Test: Negative\par Thessaly Test: Negative \par \par Left knee: Appearance - Mild swelling at left knee as comapared to right, no crepitus noted. \par ROM - flexion 150 deg., extension 0 deg. \par Palpation - No effusion or baker’s cyst appreciated. No tenderness noted along medial joint line, lateral joint line, patellar tendon, quad tendon \par Flexion Strength - 5/5 strength\par Extension Strength – 5/5\par Hip Abduction Strength – 5/5\par Hip Adduction Strength -5/5\par Dorsiflexion Strength – 5/5\par Plantar Flexion Strength - 5/5\par Lachman Test: negative\par Anterior Draw Test: Negative\par Lever Test: Negative\par Patellar Apprehension Test: Negative\par David sign: Negative\par Елена’s Test: Negative\par Thessaly Test: Negative \par \par  [de-identified] : X-rays of bilateral knees: \par \par Severe patellofemoral osteoarthritis appreciated bilaterally with left worse than the right, with some signs of mild sclerosis and osteophytes seen in bilateral knees with preserved joint space and no medial compartment collapse.  No fracture or dislocation noted, moderate OA appreciated globally bilaterally.\par \par X-ray of the left elbow:\par \par Severe global OA with multiple osteophytes noted, including on the coronoid process as well as the distal posterior humerus, no fracture or dislocation seen.  No significant posterior fat pad appreciated.

## 2022-12-06 ENCOUNTER — APPOINTMENT (OUTPATIENT)
Dept: SPORTS MEDICINE | Facility: CLINIC | Age: 67
End: 2022-12-06

## 2022-12-06 PROCEDURE — 99213 OFFICE O/P EST LOW 20 MIN: CPT | Mod: 25

## 2022-12-06 PROCEDURE — 20610 DRAIN/INJ JOINT/BURSA W/O US: CPT

## 2022-12-06 NOTE — PROCEDURE
[de-identified] : Intra-articular injection of left knee:\par \par Following preparation and sanitization of the area with alcohol, area was anesthetized with ethyl chloride, afterwards  Synvisc 2 mL (lot number [id number], expiration 4/30/2023) was injected into the knee joint.  Patient tolerated the procedure well without any issues.

## 2022-12-06 NOTE — DISCUSSION/SUMMARY
[de-identified] : Impression: Severe patellofemoral OA \par \par Patient reported significant improvement in pain.  Given that patient is on Plavix, NSAIDs were not prescribed, patient was encouraged to use Tylenol as needed for pain.  Physical therapy prescription was provided, patient was advised to return in 1 weeks for reevaluation of his pain and final injection of HA.\par \par \par Attending Attestation:\par \par Seen with Dr. Robles.\par I saw and evaluated the patient and was involved with and agree with the fellow's history, physical exam, and I personally documented the assessment and plan of care.\par \par ASSESSMENT: \par \par Patient with history of insulin-dependent diabetes (with continuous glucose monitor), hyperlipidemia, PUD, currently taking Plavix who is left-hand dominant, retired but plays golf and takes care of grandkids with:\par \par 1. Left chronic elbow pain with decreased range of motion in extension, atraumatic, no neurologic symptoms. Associated with stiffness, with decreased range of motion approximately 10 degrees of extensor lag. Concerning for osteoarthritis. X-ray with evidence of moderate osteoarthritis. Would benefit from PT, OTC analgesics, and range of motion exercises.  Still pending patient to start PT.  If not improved, consider corticosteroid injection versus referral for operative intervention. \par \par 2. Left knee pain, chronic, atraumatic. Concern for osteoarthritis was significantly affecting the patellofemoral joint, likely as a result of shallow trochlea. There is a osseous intra-articular body noted suprapatellar recess.  Imaging concerning for severe osteoarthritis in the patellofemoral joint, with mild osteoarthritis in the medial and lateral compartments. Patient with Baker's cyst on MSK ultrasound, would benefit from aspiration given the size and patient's symptoms over the posterior knee.  Due to hypoglycemic episodes with previous CSI, prefer HA and PRP in the future. Will prescribe patient with PT, have patient take OTC analgesics, and perform HEP. If not improving after injections, consider referral for operative intervention with arthroplasty.\par \par -> 11/22/22: Left knee, aspirated Baker's cyst, injection of corticosteroid to Baker's cyst and knee joint, injection of Synvisc #1\par -> 11/29/22: Left knee, injection of Synvisc #2\par -> 12/6/22: Left knee, injection of Synvisc #3\par \par Will treat symptoms with conservative management with activity modification, analgesic medications, HEP/PT, and re-evaluate the patient to see if they would benefit from further diagnostic testing, or referral for injection therapy or surgical intervention.\par \par Clinical and radiographic findings discussed. Diagnosis, prognosis and treatment options reviewed. Patient verbalizes understanding and all questions answered.\par The patient adamant at this time about not having any type of surgical intervention. Wants only to continue with non-operative management. \par \par Patient adamant at this time about not having any type of Sx intervention. Wants only to continue with conservative approach.\par \par PLAN:\par Additional Testing: None\par Further Intervention: Patient would like to continue with non-interventional measures as well as injection therapy\par Weight-bearing Status: WBAT\par Assistive Devices: None\par Therapy: HEP recommended and reviewed, PT prescribed\par Health Management: BMI/Weight Management and physical activity level reviewed with the patient\par Home Modalities: RICE protocol for pain/swelling and home modalities reviewed with the patient\par Medications: OTC analgesics and avoid NSAIDs as patient on Plavix\par Orthotics: None\par Work Status: Retired\par Activity Status: Avoid aggravating and high impact activities\par Sports/Gym Status: No gym or sports until cleared medically\par Follow-up: 3 months\par  \par Alex Trinidad MD.

## 2022-12-06 NOTE — PHYSICAL EXAM
[de-identified] : Knee Exam:\par \par General Appearance: Well-nourished, well developed in no acute distress \par Orientation: Oriented to person, place and time.\par \par Extremities: 2+ pulses distally, no edema, no cyanosis or clubbing noted. \par Gait: normal Coordination: normal \par Sensation: Subjective normal distal sensation bilaterally LE \par Skin: no rashes or lesions bilaterally\par \par Right knee: Appearance - Normal appearance, no crepitus noted. \par ROM - flexion 150 deg., extension 0 deg. \par Palpation - No effusion or baker’s cyst appreciated. No tenderness noted along medial joint line, lateral joint line, patellar tendon, quad tendon \par Flexion Strength - 5/5 strength\par Extension Strength – 5/5\par Hip Abduction Strength – 5/5\par Hip Adduction Strength -5/5\par Dorsiflexion Strength – 5/5\par Plantar Flexion Strength - 5/5\par Valgus Stress Test: Negative\par Varus Stress Test: Negative\par Lachman Test: negative\par Anterior Draw Test: Negative\par Posterior Draw\par Lever Test: Negative\par Patellar Apprehension Test: Negative\par David sign: Negative\par Елена’s Test: Negative\par Thessaly Test: Negative \par \par Left knee: Appearance - Mild swelling at left knee as comapared to right, no crepitus noted. \par ROM - flexion 150 deg., extension 0 deg. \par Palpation - No effusion or baker’s cyst appreciated. No tenderness noted along medial joint line, lateral joint line, patellar tendon, quad tendon \par Flexion Strength - 5/5 strength\par Extension Strength – 5/5\par Hip Abduction Strength – 5/5\par Hip Adduction Strength -5/5\par Dorsiflexion Strength – 5/5\par Plantar Flexion Strength - 5/5\par Lachman Test: negative\par Anterior Draw Test: Negative\par Lever Test: Negative\par Patellar Apprehension Test: Negative\par David sign: Negative\par Елена’s Test: Negative\par Thessaly Test: Negative \par \par  [de-identified] : X-rays of bilateral knees: \par \par Severe patellofemoral osteoarthritis appreciated bilaterally with left worse than the right, with some signs of mild sclerosis and osteophytes seen in bilateral knees with preserved joint space and no medial compartment collapse.  No fracture or dislocation noted, moderate OA appreciated globally bilaterally.\par \par X-ray of the left elbow:\par \par Severe global OA with multiple osteophytes noted, including on the coronoid process as well as the distal posterior humerus, no fracture or dislocation seen.  No significant posterior fat pad appreciated.

## 2022-12-06 NOTE — HISTORY OF PRESENT ILLNESS
[de-identified] : 67-year-old male with a past medical history of BPH, high cholesterol, insulin-dependent diabetes presenting with left knee pain and repeat gel injection following first HA injection last week. Had steroid injection in addition last week, and reports significant improvement since last week.  However, blood sugars elevated up to 300s, requiring increased insulin use, hypoglycemia resolved in 2 to 3 days without any complications.  Denies any numbness, tingling, paresthesias, swelling, fever, chills. Reports improvement in knee pain,  including with activities as well as pain with stairs w/c has resolved.

## 2022-12-21 ENCOUNTER — RX RENEWAL (OUTPATIENT)
Age: 67
End: 2022-12-21

## 2022-12-27 ENCOUNTER — RX RENEWAL (OUTPATIENT)
Age: 67
End: 2022-12-27

## 2023-01-20 ENCOUNTER — APPOINTMENT (OUTPATIENT)
Dept: UROLOGY | Facility: CLINIC | Age: 68
End: 2023-01-20

## 2023-01-30 ENCOUNTER — APPOINTMENT (OUTPATIENT)
Dept: UROLOGY | Facility: CLINIC | Age: 68
End: 2023-01-30
Payer: MEDICARE

## 2023-01-30 PROCEDURE — 99213 OFFICE O/P EST LOW 20 MIN: CPT

## 2023-01-30 RX ORDER — ACETAMINOPHEN AND CODEINE 300; 30 MG/1; MG/1
300-30 TABLET ORAL
Qty: 6 | Refills: 0 | Status: DISCONTINUED | COMMUNITY
Start: 2021-11-30 | End: 2023-01-30

## 2023-01-30 RX ORDER — FLASH GLUCOSE SENSOR
KIT MISCELLANEOUS
Qty: 3 | Refills: 0 | Status: DISCONTINUED | COMMUNITY
Start: 2018-03-26 | End: 2023-01-30

## 2023-01-30 RX ORDER — FLUOROURACIL 50 MG/G
5 CREAM TOPICAL
Qty: 40 | Refills: 0 | Status: DISCONTINUED | COMMUNITY
Start: 2021-12-09 | End: 2023-01-30

## 2023-01-30 RX ORDER — FLASH GLUCOSE SENSOR
KIT MISCELLANEOUS
Qty: 1 | Refills: 0 | Status: DISCONTINUED | COMMUNITY
Start: 2018-03-20 | End: 2023-01-30

## 2023-01-30 RX ORDER — DAPAGLIFLOZIN 5 MG/1
5 TABLET, FILM COATED ORAL
Qty: 90 | Refills: 0 | Status: DISCONTINUED | COMMUNITY
Start: 2021-06-29 | End: 2023-01-30

## 2023-01-30 NOTE — ASSESSMENT
[FreeTextEntry1] : Tamsulosin was refilled.  Side effects were discussed.  From urinary standpoint he is doing well.  He is emptying his bladder well.  PSA level will be repeated.  He will undergo renal ultrasound and follow-up to kidney stone.  He has continued monitoring for now.  He will follow-up in 1 year.\par \par David Tran MD, FACS\par [facility]\par  of Urology\par \par [address], [address]\[place] [address]\par \par [address], [address]\[place] [address]\par \par Tel: [phone]\par Fax: [phone]

## 2023-01-30 NOTE — LETTER BODY
[Dear  ___] : Dear  [unfilled], [Attached please find my note.] : Attached please find my note. [Thank you very much for allowing me to participate in the care of this patient. If you have any questions, please do not hesitate to contact me] : Thank you very much for allowing me to participate in the care of this patient. If you have any questions, please do not hesitate to contact me. [FreeTextEntry1] : [address] [place] [address] \par  [phone]\par

## 2023-01-30 NOTE — PHYSICAL EXAM
[General Appearance - Well Developed] : well developed [General Appearance - Well Nourished] : well nourished [Normal Appearance] : normal appearance [Well Groomed] : well groomed [General Appearance - In No Acute Distress] : no acute distress [Abdomen Soft] : soft [Abdomen Tenderness] : non-tender [Urethral Meatus] : meatus normal [Costovertebral Angle Tenderness] : no ~M costovertebral angle tenderness [Penis Abnormality] : normal circumcised penis [Urinary Bladder Findings] : the bladder was normal on palpation [Scrotum] : the scrotum was normal [Epididymis] : the epididymides were normal [Testes Tenderness] : no tenderness of the testes [Testes Mass (___cm)] : there were no testicular masses [Prostate Tenderness] : the prostate was not tender [No Prostate Nodules] : no prostate nodules [Prostate Enlarged] : was enlarged [] : no respiratory distress [Respiration, Rhythm And Depth] : normal respiratory rhythm and effort [Exaggerated Use Of Accessory Muscles For Inspiration] : no accessory muscle use [Oriented To Time, Place, And Person] : oriented to person, place, and time [Affect] : the affect was normal [Mood] : the mood was normal [Not Anxious] : not anxious

## 2023-01-30 NOTE — HISTORY OF PRESENT ILLNESS
[FreeTextEntry1] : He is a 67 year-old man who is seen today in follow-up for urinary symptoms and kidney stone.  Nocturia is usually once or twice.  Urinary flow is normal for him.  Residual urine volume today was about 80 mL.  A1c was 7.1 recently.  PSA level was stable which was 2.14 in January 2022.  He is repeating PSA level today.  He has no flank pain.  He is on tamsulosin.\par \par Ultrasound also in January 2021 showed a left lower pole kidney stone 5 mm. The prostate measured about 76 cc.  He does not complain of erectile dysfunction.\par \par Previous notes: Ultrasound in February 2020 showed 1.1 cm left lower pole kidney stone and a small parapelvic renal cyst.  Prostate measured 63 g and the residual urine volume was minimal. \par Ultrasound in February 2019 showed that the left lower pole kidney stone was 9 mm and there was a parapelvic renal cyst. Maximum urinary flow was 15.8 and average flow 8 mL per second. He voided 200 cc and residual urine was 50 cc.\par As a teenager, he may have had dilation of the urethra or meatotomy. CT scan in 2016 showed left renal stone 5 mm and enlarged prostate.

## 2023-01-31 ENCOUNTER — NON-APPOINTMENT (OUTPATIENT)
Age: 68
End: 2023-01-31

## 2023-01-31 LAB — PSA SERPL-MCNC: 2.98 NG/ML

## 2023-02-09 ENCOUNTER — NON-APPOINTMENT (OUTPATIENT)
Age: 68
End: 2023-02-09

## 2023-02-09 ENCOUNTER — APPOINTMENT (OUTPATIENT)
Dept: OTOLARYNGOLOGY | Facility: CLINIC | Age: 68
End: 2023-02-09
Payer: MEDICARE

## 2023-02-09 VITALS
TEMPERATURE: 97.6 F | DIASTOLIC BLOOD PRESSURE: 71 MMHG | HEART RATE: 65 BPM | SYSTOLIC BLOOD PRESSURE: 133 MMHG | BODY MASS INDEX: 24.92 KG/M2 | WEIGHT: 178 LBS | HEIGHT: 71 IN

## 2023-02-09 PROCEDURE — 31238 NSL/SINS NDSC SRG NSL HEMRRG: CPT | Mod: RT

## 2023-02-09 PROCEDURE — 99214 OFFICE O/P EST MOD 30 MIN: CPT | Mod: 25

## 2023-02-09 NOTE — END OF VISIT
[FreeTextEntry3] : I, Dr. Burris personally performed the evaluation and management (E/M) services including all necessary procedures, for this new patient. That E/M includes conducting the clinically appropriate initial history &/or exam, assessing all conditions, and establishing the plan of care. Today, my BIN, Ashley Mccrary, was here to observe &/or participate in the visit & follow plan of care established by me.\par \par \par

## 2023-02-09 NOTE — CONSULT LETTER
[Dear  ___] : Dear  [unfilled], [Consult Letter:] : I had the pleasure of evaluating your patient, [unfilled]. [Please see my note below.] : Please see my note below. [Consult Closing:] : Thank you very much for allowing me to participate in the care of this patient.  If you have any questions, please do not hesitate to contact me. [Sincerely,] : Sincerely, [FreeTextEntry3] : Cain Burris MD\par [facility]\par Otolaryngology and Facial Plastics\par [employer] Professor, [employer]\par

## 2023-02-09 NOTE — ASSESSMENT
[FreeTextEntry1] : Patient 67-year-old diabetic on blood thinners for his diabetes to avoid cardiac disease was having some recent nosebleeds stopped his blood thinners 2 days ago has a 12-hour half-life on examination severely deviated septum to the left source of bleeding identified endoscopically in the right Paxton box plexus cauterized with silver nitrate endoscopically hopefully no further intervention will be indicated he will stay off of his blood thinners for a week and resume.  He was given instructions of what to do in the future if indeed he gets recurrent nosebleeds.

## 2023-02-09 NOTE — DATA REVIEWED
[No studies available for review at this time] : No studies available for review at this time Bcc Infiltrative Histology Text: There were numerous aggregates of basaloid cells demonstrating an infiltrative pattern.

## 2023-02-09 NOTE — HISTORY OF PRESENT ILLNESS
[de-identified] : Patient is on Clopidogrel, prescribed by his cardiologist for diabetes as per pt, has been having nosebleeds for the last few weeks, 3-4 times a week. He has held off on taking the blood thinner since Monday. The last nosebleed was this morning, a few tablespoons from the right nasal cavity. The nosebleeds come on with hot showers, cold weather, nose blowing.\par He does use saline to moisturize the nose. He denies having been cauterized in the past.

## 2023-02-15 ENCOUNTER — APPOINTMENT (OUTPATIENT)
Dept: ULTRASOUND IMAGING | Facility: CLINIC | Age: 68
End: 2023-02-15
Payer: MEDICARE

## 2023-02-15 PROCEDURE — 76775 US EXAM ABDO BACK WALL LIM: CPT

## 2023-02-17 ENCOUNTER — NON-APPOINTMENT (OUTPATIENT)
Age: 68
End: 2023-02-17

## 2023-02-21 ENCOUNTER — APPOINTMENT (OUTPATIENT)
Dept: OTOLARYNGOLOGY | Facility: CLINIC | Age: 68
End: 2023-02-21

## 2023-03-17 ENCOUNTER — RX RENEWAL (OUTPATIENT)
Age: 68
End: 2023-03-17

## 2023-05-10 ENCOUNTER — APPOINTMENT (OUTPATIENT)
Dept: GASTROENTEROLOGY | Facility: CLINIC | Age: 68
End: 2023-05-10
Payer: MEDICARE

## 2023-05-10 VITALS
WEIGHT: 175 LBS | BODY MASS INDEX: 24.5 KG/M2 | HEIGHT: 71 IN | HEART RATE: 60 BPM | DIASTOLIC BLOOD PRESSURE: 51 MMHG | SYSTOLIC BLOOD PRESSURE: 114 MMHG

## 2023-05-10 DIAGNOSIS — D64.9 ANEMIA, UNSPECIFIED: ICD-10-CM

## 2023-05-10 DIAGNOSIS — K21.00 GASTRO-ESOPHAGEAL REFLUX DISEASE WITH ESOPHAGITIS, WITHOUT BLEEDING: ICD-10-CM

## 2023-05-10 DIAGNOSIS — K31.84 GASTROPARESIS: ICD-10-CM

## 2023-05-10 DIAGNOSIS — R19.5 OTHER FECAL ABNORMALITIES: ICD-10-CM

## 2023-05-10 DIAGNOSIS — Z12.12 ENCOUNTER FOR SCREENING FOR MALIGNANT NEOPLASM OF COLON: ICD-10-CM

## 2023-05-10 DIAGNOSIS — Z12.11 ENCOUNTER FOR SCREENING FOR MALIGNANT NEOPLASM OF COLON: ICD-10-CM

## 2023-05-10 DIAGNOSIS — R10.13 EPIGASTRIC PAIN: ICD-10-CM

## 2023-05-10 PROCEDURE — 82272 OCCULT BLD FECES 1-3 TESTS: CPT

## 2023-05-10 PROCEDURE — 99214 OFFICE O/P EST MOD 30 MIN: CPT | Mod: 25

## 2023-05-10 NOTE — REVIEW OF SYSTEMS
[Abdominal Pain] : abdominal pain [Constipation] : constipation [As Noted in HPI] : as noted in HPI [Heartburn] : heartburn

## 2023-05-10 NOTE — PHYSICAL EXAM
[Healthy Appearing] : healthy appearing [No Acute Distress] : no acute distress [Well Developed] : well developed [Well Nourished] : well nourished [None] : no edema [Bowel Sounds] : normal bowel sounds [Abdomen Tenderness] : non-tender [Abdomen Soft] : soft [+2] : prostate: +2 [Inguinal Lymph Nodes Enlarged Bilaterally] : no inguinal lymphadenopathy [Normal Color / Pigmentation] : normal skin color and pigmentation [Normal] : oriented to person, place, and time [Occult Blood] : negative occult blood [de-identified] : small reducible LIH

## 2023-05-10 NOTE — ASSESSMENT
[FreeTextEntry1] : 1. History of GERD, duodenal ulcers, with essentially negative EGD June 2021.  Probable mild gastroparesis.\par 2. Diverticulosis, hemorrhoids at repeat colonoscopy December 2021.\par 3. Mild anemia, likely chronic disease, perhaps with recurrent epistaxis contributing.   \par 4. Type 2 diabetes mellitus.\par 5. Hypercholesterolemia.\par 6. Possible history of TIA, maintained on Plavix.\par 7. BPH; nephrolithiasis.\par 8. Recurrent epistaxis.\par 9. Status post left arm lipoma excision.\par 10. Allergic to penicillin, sulfa.\par \par Plan:\par 1.  He will retrieve latest (and 5/31) labs for my review.\par 2.  Continue pantoprazole indefinitely; ondansetron as needed.\par 3.  If decreasing Hgb despite ENT cautery, might consider capsule endoscopy of the small intestine.\par 4.  Otherwise, return in 1 year.\par \par

## 2023-05-10 NOTE — HISTORY OF PRESENT ILLNESS
[FreeTextEntry1] : David reports only infrequent heartburn and epigastric pain, taking pantoprazole 40 mg daily and ondansetron 8 mg on demand.  When symptomatic, the ondansetron works within 15-20 minutes.  He tends towards constipation, averaging BM every 1-2 days, without recent rectal bleeding.  He was noted to have mild anemia, largely attributed to frequent epistaxis, now "nosebleeds 90% better," with repeat labs on 5/31. Repeat EGD 6/16/2021 was essentially negative.  Repeat colonoscopy 12/13/2021 revealed extensive diverticulosis and hemorrhoids.  He continues on Plavix daily for possible TIA.

## 2023-06-23 NOTE — BRIEF OPERATIVE NOTE - NSICDXBRIEFPREOP_GEN_ALL_CORE_FT
Problem: Pain  Goal: #Acceptable pain level achieved/maintained at rest using NRS/Faces  Description: This goal is used for patients who can self-report.  Acceptable means the level is at or below the identified comfort/function goal.  Outcome: Outcome Met, Continue evaluating goal progress toward completion     Problem: Pain  Goal: # Verbalizes understanding of pain management  Description: Documented in Patient Education Activity  Outcome: Outcome Met, Continue evaluating goal progress toward completion     Problem: Postoperative Care  Goal: Vital signs are maintained within parameters  Outcome: Outcome Met, Continue evaluating goal progress toward completion      PRE-OP DIAGNOSIS:  Ganglion cyst 07-Jul-2021 12:33:22  Chris Peters

## 2023-10-16 NOTE — H&P PST ADULT - AS BP NONINV METHOD
Goal Outcome Evaluation:  Plan of Care Reviewed With: patient        Progress: improving  Outcome Evaluation: pt alert and oriented x4. c/o pain in the hip, medicated per order. blood glucose monitored per orders. skin and wound care done per orders. no other concerns at this time.          electronic

## 2023-11-09 ENCOUNTER — NON-APPOINTMENT (OUTPATIENT)
Age: 68
End: 2023-11-09

## 2023-12-22 ENCOUNTER — APPOINTMENT (OUTPATIENT)
Dept: OTOLARYNGOLOGY | Facility: CLINIC | Age: 68
End: 2023-12-22
Payer: MEDICARE

## 2023-12-22 VITALS
WEIGHT: 175 LBS | SYSTOLIC BLOOD PRESSURE: 128 MMHG | HEART RATE: 71 BPM | DIASTOLIC BLOOD PRESSURE: 74 MMHG | BODY MASS INDEX: 24.5 KG/M2 | HEIGHT: 71 IN

## 2023-12-22 DIAGNOSIS — D68.9 COAGULATION DEFECT, UNSPECIFIED: ICD-10-CM

## 2023-12-22 DIAGNOSIS — R04.0 EPISTAXIS: ICD-10-CM

## 2023-12-22 PROCEDURE — 99213 OFFICE O/P EST LOW 20 MIN: CPT | Mod: 25

## 2023-12-22 PROCEDURE — 30903 CONTROL OF NOSEBLEED: CPT | Mod: RT

## 2023-12-22 RX ORDER — MUPIROCIN 20 MG/G
2 OINTMENT TOPICAL
Qty: 1 | Refills: 2 | Status: ACTIVE | COMMUNITY
Start: 2023-12-22 | End: 1900-01-01

## 2023-12-22 NOTE — PHYSICAL EXAM
[de-identified] : RIGHT SIDED UPPER KISSELBACH AREA CAUTERIZED [Normal] : mucosa is normal [Midline] : trachea located in midline position

## 2023-12-22 NOTE — HISTORY OF PRESENT ILLNESS
[de-identified] : EPISTAXIS RIGHT SIDE PAST FEW DAYS SIMILAR EPISODES IN THE PAST  MEDICAL HX REVIEWED PLAVIX

## 2024-01-05 ENCOUNTER — RX RENEWAL (OUTPATIENT)
Age: 69
End: 2024-01-05

## 2024-01-05 RX ORDER — ONDANSETRON 8 MG/1
8 TABLET ORAL
Qty: 90 | Refills: 3 | Status: ACTIVE | COMMUNITY
Start: 2018-02-26 | End: 1900-01-01

## 2024-01-11 ENCOUNTER — APPOINTMENT (OUTPATIENT)
Dept: OTOLARYNGOLOGY | Facility: CLINIC | Age: 69
End: 2024-01-11

## 2024-01-30 ENCOUNTER — NON-APPOINTMENT (OUTPATIENT)
Age: 69
End: 2024-01-30

## 2024-02-09 ENCOUNTER — APPOINTMENT (OUTPATIENT)
Dept: UROLOGY | Facility: CLINIC | Age: 69
End: 2024-02-09
Payer: MEDICARE

## 2024-02-09 VITALS
BODY MASS INDEX: 24.5 KG/M2 | HEIGHT: 71 IN | OXYGEN SATURATION: 95 % | RESPIRATION RATE: 16 BRPM | TEMPERATURE: 98 F | SYSTOLIC BLOOD PRESSURE: 132 MMHG | WEIGHT: 175 LBS | HEART RATE: 64 BPM | DIASTOLIC BLOOD PRESSURE: 73 MMHG

## 2024-02-09 DIAGNOSIS — N20.0 CALCULUS OF KIDNEY: ICD-10-CM

## 2024-02-09 DIAGNOSIS — N40.1 BENIGN PROSTATIC HYPERPLASIA WITH LOWER URINARY TRACT SYMPMS: ICD-10-CM

## 2024-02-09 DIAGNOSIS — N13.8 BENIGN PROSTATIC HYPERPLASIA WITH LOWER URINARY TRACT SYMPMS: ICD-10-CM

## 2024-02-09 PROCEDURE — 99213 OFFICE O/P EST LOW 20 MIN: CPT

## 2024-02-09 NOTE — LETTER BODY
[Dear  ___] : Dear  [unfilled], [Attached please find my note.] : Attached please find my note. [Thank you very much for allowing me to participate in the care of this patient. If you have any questions, please do not hesitate to contact me] : Thank you very much for allowing me to participate in the care of this patient. If you have any questions, please do not hesitate to contact me. [FreeTextEntry1] : [id number] [place] [address] \par   [phone]\par

## 2024-02-09 NOTE — HISTORY OF PRESENT ILLNESS
[FreeTextEntry1] : He is a 68 year-old man who is seen today in follow-up for urinary symptoms and kidney stone.  He is on tamsulosin.  Nocturia is twice and sometimes 3 times.  Residual urine volume today was minimal.  There is no flank pain.  In January 2023, PSA level was 2.98.  Ultrasound in February 2023 showed a 7 mm and there left-sided kidney stone.  A1c level is around 7.  Previous notes: Ultrasound also in January 2021 showed a left lower pole kidney stone 5 mm. The prostate measured about 76 cc.  He does not complain of erectile dysfunction. Ultrasound in February 2020 showed 1.1 cm left lower pole kidney stone and a small parapelvic renal cyst.  Prostate measured 63 g and the residual urine volume was minimal.  Ultrasound in February 2019 showed that the left lower pole kidney stone was 9 mm and there was a parapelvic renal cyst. Maximum urinary flow was 15.8 and average flow 8 mL per second. He voided 200 cc and residual urine was 50 cc. As a teenager, he may have had dilation of the urethra or meatotomy. CT scan in 2016 showed left renal stone 5 mm and enlarged prostate.

## 2024-02-09 NOTE — PHYSICAL EXAM
[Urethral Meatus] : meatus normal [Penis Abnormality] : normal circumcised penis [Urinary Bladder Findings] : the bladder was normal on palpation [Scrotum] : the scrotum was normal [Epididymis] : the epididymides were normal [Testes Tenderness] : no tenderness of the testes [Testes Mass (___cm)] : there were no testicular masses [Prostate Tenderness] : the prostate was not tender [No Prostate Nodules] : no prostate nodules [Prostate Enlarged] : was enlarged [FreeTextEntry1] : Genital exam was done in 2023 [General Appearance - Well Developed] : well developed [General Appearance - Well Nourished] : well nourished [Normal Appearance] : normal appearance [Well Groomed] : well groomed [General Appearance - In No Acute Distress] : no acute distress [] : no respiratory distress [Exaggerated Use Of Accessory Muscles For Inspiration] : no accessory muscle use [Abdomen Soft] : soft [Abdomen Tenderness] : non-tender [Normal Station and Gait] : the gait and station were normal for the patient's age [Oriented To Time, Place, And Person] : oriented to person, place, and time [Affect] : the affect was normal [Mood] : the mood was normal [Not Anxious] : not anxious

## 2024-02-11 LAB — PSA SERPL-MCNC: 2.34 NG/ML

## 2024-02-15 ENCOUNTER — APPOINTMENT (OUTPATIENT)
Dept: ULTRASOUND IMAGING | Facility: CLINIC | Age: 69
End: 2024-02-15
Payer: MEDICARE

## 2024-02-15 PROCEDURE — 76775 US EXAM ABDO BACK WALL LIM: CPT

## 2024-04-09 ENCOUNTER — APPOINTMENT (OUTPATIENT)
Dept: MRI IMAGING | Facility: CLINIC | Age: 69
End: 2024-04-09
Payer: MEDICARE

## 2024-04-09 ENCOUNTER — APPOINTMENT (OUTPATIENT)
Dept: CT IMAGING | Facility: CLINIC | Age: 69
End: 2024-04-09
Payer: MEDICARE

## 2024-04-09 PROCEDURE — A9585: CPT | Mod: JW

## 2024-04-09 PROCEDURE — 70553 MRI BRAIN STEM W/O & W/DYE: CPT | Mod: MH

## 2024-04-09 PROCEDURE — 71260 CT THORAX DX C+: CPT | Mod: MH

## 2024-04-09 PROCEDURE — 70546 MR ANGIOGRAPH HEAD W/O&W/DYE: CPT | Mod: MH,59

## 2024-06-18 ENCOUNTER — RX RENEWAL (OUTPATIENT)
Age: 69
End: 2024-06-18

## 2024-06-18 RX ORDER — PANTOPRAZOLE 40 MG/1
40 TABLET, DELAYED RELEASE ORAL
Qty: 90 | Refills: 0 | Status: ACTIVE | COMMUNITY
Start: 2018-10-02 | End: 1900-01-01

## 2024-09-23 NOTE — ED ADULT NURSE NOTE - GENITOURINARY WDL
Tetracycline Counseling: Patient counseled regarding possible photosensitivity and increased risk for sunburn.  Patient instructed to avoid sunlight, if possible.  When exposed to sunlight, patients should wear protective clothing, sunglasses, and sunscreen.  The patient was instructed to call the office immediately if the following severe adverse effects occur:  hearing changes, easy bruising/bleeding, severe headache, or vision changes.  The patient verbalized understanding of the proper use and possible adverse effects of tetracycline.  All of the patient's questions and concerns were addressed. Patient understands to avoid pregnancy while on therapy due to potential birth defects. Topical Clindamycin Pregnancy And Lactation Text: This medication is Pregnancy Category B and is considered safe during pregnancy. It is unknown if it is excreted in breast milk. Benzoyl Peroxide Counseling: Patient counseled that medicine may cause skin irritation and bleach clothing.  In the event of skin irritation, the patient was advised to reduce the amount of the drug applied or use it less frequently.   The patient verbalized understanding of the proper use and possible adverse effects of benzoyl peroxide.  All of the patient's questions and concerns were addressed. Sarecycline Counseling: Patient advised regarding possible photosensitivity and discoloration of the teeth, skin, lips, tongue and gums.  Patient instructed to avoid sunlight, if possible.  When exposed to sunlight, patients should wear protective clothing, sunglasses, and sunscreen.  The patient was instructed to call the office immediately if the following severe adverse effects occur:  hearing changes, easy bruising/bleeding, severe headache, or vision changes.  The patient verbalized understanding of the proper use and possible adverse effects of sarecycline.  All of the patient's questions and concerns were addressed. Doxycycline Counseling:  Patient counseled regarding possible photosensitivity and increased risk for sunburn.  Patient instructed to avoid sunlight, if possible.  When exposed to sunlight, patients should wear protective clothing, sunglasses, and sunscreen.  The patient was instructed to call the office immediately if the following severe adverse effects occur:  hearing changes, easy bruising/bleeding, severe headache, or vision changes.  The patient verbalized understanding of the proper use and possible adverse effects of doxycycline.  All of the patient's questions and concerns were addressed. Winlevi Pregnancy And Lactation Text: This medication is considered safe during pregnancy and breastfeeding. Use Enhanced Medication Counseling?: No Tazorac Pregnancy And Lactation Text: This medication is not safe during pregnancy. It is unknown if this medication is excreted in breast milk. Topical Retinoid counseling:  Patient advised to apply a pea-sized amount only at bedtime and wait 30 minutes after washing their face before applying.  If too drying, patient may add a non-comedogenic moisturizer. The patient verbalized understanding of the proper use and possible adverse effects of retinoids.  All of the patient's questions and concerns were addressed. Winlevi Counseling:  I discussed with the patient the risks of topical clascoterone including but not limited to erythema, scaling, itching, and stinging. Patient voiced their understanding. Sarecycline Pregnancy And Lactation Text: This medication is Pregnancy Category D and not consider safe during pregnancy. It is also excreted in breast milk. Azithromycin Pregnancy And Lactation Text: This medication is considered safe during pregnancy and is also secreted in breast milk. Minocycline Counseling: Patient advised regarding possible photosensitivity and discoloration of the teeth, skin, lips, tongue and gums.  Patient instructed to avoid sunlight, if possible.  When exposed to sunlight, patients should wear protective clothing, sunglasses, and sunscreen.  The patient was instructed to call the office immediately if the following severe adverse effects occur:  hearing changes, easy bruising/bleeding, severe headache, or vision changes.  The patient verbalized understanding of the proper use and possible adverse effects of minocycline.  All of the patient's questions and concerns were addressed. Birth Control Pills Counseling: Birth Control Pill Counseling: I discussed with the patient the potential side effects of OCPs including but not limited to increased risk of stroke, heart attack, thrombophlebitis, deep venous thrombosis, hepatic adenomas, breast changes, GI upset, headaches, and depression.  The patient verbalized understanding of the proper use and possible adverse effects of OCPs. All of the patient's questions and concerns were addressed. Erythromycin Counseling:  I discussed with the patient the risks of erythromycin including but not limited to GI upset, allergic reaction, drug rash, diarrhea, increase in liver enzymes, and yeast infections. Doxycycline Pregnancy And Lactation Text: This medication is Pregnancy Category D and not consider safe during pregnancy. It is also excreted in breast milk but is considered safe for shorter treatment courses. Tazorac Counseling:  Patient advised that medication is irritating and drying.  Patient may need to apply sparingly and wash off after an hour before eventually leaving it on overnight.  The patient verbalized understanding of the proper use and possible adverse effects of tazorac.  All of the patient's questions and concerns were addressed. Topical Retinoid Pregnancy And Lactation Text: This medication is Pregnancy Category C. It is unknown if this medication is excreted in breast milk. High Dose Vitamin A Counseling: Side effects reviewed, pt to contact office should one occur. Bactrim Pregnancy And Lactation Text: This medication is Pregnancy Category D and is known to cause fetal risk.  It is also excreted in breast milk. Spironolactone Pregnancy And Lactation Text: This medication can cause feminization of the male fetus and should be avoided during pregnancy. The active metabolite is also found in breast milk. Azelaic Acid Counseling: Patient counseled that medicine may cause skin irritation and to avoid applying near the eyes.  In the event of skin irritation, the patient was advised to reduce the amount of the drug applied or use it less frequently.   The patient verbalized understanding of the proper use and possible adverse effects of azelaic acid.  All of the patient's questions and concerns were addressed. Dapsone Pregnancy And Lactation Text: This medication is Pregnancy Category C and is not considered safe during pregnancy or breast feeding. Topical Sulfur Applications Pregnancy And Lactation Text: This medication is Pregnancy Category C and has an unknown safety profile during pregnancy. It is unknown if this topical medication is excreted in breast milk. Topical Sulfur Applications Counseling: Topical Sulfur Counseling: Patient counseled that this medication may cause skin irritation or allergic reactions.  In the event of skin irritation, the patient was advised to reduce the amount of the drug applied or use it less frequently.   The patient verbalized understanding of the proper use and possible adverse effects of topical sulfur application.  All of the patient's questions and concerns were addressed. Azithromycin Counseling:  I discussed with the patient the risks of azithromycin including but not limited to GI upset, allergic reaction, drug rash, diarrhea, and yeast infections. Birth Control Pills Pregnancy And Lactation Text: This medication should be avoided if pregnant and for the first 30 days post-partum. Azelaic Acid Pregnancy And Lactation Text: This medication is considered safe during pregnancy and breast feeding. Bactrim Counseling:  I discussed with the patient the risks of sulfa antibiotics including but not limited to GI upset, allergic reaction, drug rash, diarrhea, dizziness, photosensitivity, and yeast infections.  Rarely, more serious reactions can occur including but not limited to aplastic anemia, agranulocytosis, methemoglobinemia, blood dyscrasias, liver or kidney failure, lung infiltrates or desquamative/blistering drug rashes. High Dose Vitamin A Pregnancy And Lactation Text: High dose vitamin A therapy is contraindicated during pregnancy and breast feeding. Erythromycin Pregnancy And Lactation Text: This medication is Pregnancy Category B and is considered safe during pregnancy. It is also excreted in breast milk. Detail Level: Zone Spironolactone Counseling: Patient advised regarding risks of diarrhea, abdominal pain, hyperkalemia, birth defects (for female patients), liver toxicity and renal toxicity. The patient may need blood work to monitor liver and kidney function and potassium levels while on therapy. The patient verbalized understanding of the proper use and possible adverse effects of spironolactone.  All of the patient's questions and concerns were addressed. Isotretinoin Pregnancy And Lactation Text: This medication is Pregnancy Category X and is considered extremely dangerous during pregnancy. It is unknown if it is excreted in breast milk. Benzoyl Peroxide Pregnancy And Lactation Text: This medication is Pregnancy Category C. It is unknown if benzoyl peroxide is excreted in breast milk. Dapsone Counseling: I discussed with the patient the risks of dapsone including but not limited to hemolytic anemia, agranulocytosis, rashes, methemoglobinemia, kidney failure, peripheral neuropathy, headaches, GI upset, and liver toxicity.  Patients who start dapsone require monitoring including baseline LFTs and weekly CBCs for the first month, then every month thereafter.  The patient verbalized understanding of the proper use and possible adverse effects of dapsone.  All of the patient's questions and concerns were addressed. Topical Clindamycin Counseling: Patient counseled that this medication may cause skin irritation or allergic reactions.  In the event of skin irritation, the patient was advised to reduce the amount of the drug applied or use it less frequently.   The patient verbalized understanding of the proper use and possible adverse effects of clindamycin.  All of the patient's questions and concerns were addressed. Isotretinoin Counseling: Patient should get monthly blood tests, not donate blood, not drive at night if vision affected, not share medication, and not undergo elective surgery for 6 months after tx completed. Side effects reviewed, pt to contact office should one occur. Bladder non-tender and non-distended. Urine clear yellow.

## 2024-09-24 ENCOUNTER — RX RENEWAL (OUTPATIENT)
Age: 69
End: 2024-09-24

## 2024-10-01 ENCOUNTER — APPOINTMENT (OUTPATIENT)
Dept: CT IMAGING | Facility: CLINIC | Age: 69
End: 2024-10-01
Payer: MEDICARE

## 2024-10-01 PROCEDURE — 70486 CT MAXILLOFACIAL W/O DYE: CPT | Mod: MH

## 2024-10-28 NOTE — ED ADULT NURSE NOTE - CAS TRG GEN SKIN CONDITION
Warm

## 2024-10-31 ENCOUNTER — APPOINTMENT (OUTPATIENT)
Dept: GASTROENTEROLOGY | Facility: CLINIC | Age: 69
End: 2024-10-31
Payer: MEDICARE

## 2024-10-31 ENCOUNTER — NON-APPOINTMENT (OUTPATIENT)
Age: 69
End: 2024-10-31

## 2024-10-31 VITALS
HEART RATE: 63 BPM | DIASTOLIC BLOOD PRESSURE: 69 MMHG | WEIGHT: 175 LBS | SYSTOLIC BLOOD PRESSURE: 134 MMHG | BODY MASS INDEX: 24.5 KG/M2 | HEIGHT: 71 IN

## 2024-10-31 DIAGNOSIS — K59.00 CONSTIPATION, UNSPECIFIED: ICD-10-CM

## 2024-10-31 DIAGNOSIS — K21.00 GASTRO-ESOPHAGEAL REFLUX DISEASE WITH ESOPHAGITIS, WITHOUT BLEEDING: ICD-10-CM

## 2024-10-31 DIAGNOSIS — D64.9 ANEMIA, UNSPECIFIED: ICD-10-CM

## 2024-10-31 DIAGNOSIS — R10.13 EPIGASTRIC PAIN: ICD-10-CM

## 2024-10-31 DIAGNOSIS — R11.0 NAUSEA: ICD-10-CM

## 2024-10-31 PROCEDURE — 82272 OCCULT BLD FECES 1-3 TESTS: CPT

## 2024-10-31 PROCEDURE — 99214 OFFICE O/P EST MOD 30 MIN: CPT | Mod: 25

## 2024-10-31 RX ORDER — INSULIN DEGLUDEC INJECTION 200 U/ML
INJECTION, SOLUTION SUBCUTANEOUS
Refills: 0 | Status: ACTIVE | COMMUNITY

## 2024-11-03 ENCOUNTER — NON-APPOINTMENT (OUTPATIENT)
Age: 69
End: 2024-11-03

## 2024-11-04 ENCOUNTER — NON-APPOINTMENT (OUTPATIENT)
Age: 69
End: 2024-11-04

## 2024-11-04 LAB — H PYLORI AG STL QL: NEGATIVE

## 2024-11-13 ENCOUNTER — APPOINTMENT (OUTPATIENT)
Dept: ULTRASOUND IMAGING | Facility: CLINIC | Age: 69
End: 2024-11-13
Payer: MEDICARE

## 2024-11-13 PROCEDURE — 76700 US EXAM ABDOM COMPLETE: CPT

## 2024-11-17 ENCOUNTER — NON-APPOINTMENT (OUTPATIENT)
Age: 69
End: 2024-11-17

## 2024-11-18 ENCOUNTER — NON-APPOINTMENT (OUTPATIENT)
Age: 69
End: 2024-11-18

## 2024-11-21 ENCOUNTER — APPOINTMENT (OUTPATIENT)
Dept: UROLOGY | Facility: CLINIC | Age: 69
End: 2024-11-21
Payer: MEDICARE

## 2024-11-21 DIAGNOSIS — N40.1 BENIGN PROSTATIC HYPERPLASIA WITH LOWER URINARY TRACT SYMPMS: ICD-10-CM

## 2024-11-21 DIAGNOSIS — N50.811 RIGHT TESTICULAR PAIN: ICD-10-CM

## 2024-11-21 DIAGNOSIS — N20.0 CALCULUS OF KIDNEY: ICD-10-CM

## 2024-11-21 DIAGNOSIS — N13.8 BENIGN PROSTATIC HYPERPLASIA WITH LOWER URINARY TRACT SYMPMS: ICD-10-CM

## 2024-11-21 DIAGNOSIS — N50.812 RIGHT TESTICULAR PAIN: ICD-10-CM

## 2024-11-21 PROCEDURE — 99214 OFFICE O/P EST MOD 30 MIN: CPT

## 2024-11-21 PROCEDURE — G2211 COMPLEX E/M VISIT ADD ON: CPT

## 2024-11-27 ENCOUNTER — APPOINTMENT (OUTPATIENT)
Dept: ULTRASOUND IMAGING | Facility: CLINIC | Age: 69
End: 2024-11-27
Payer: MEDICARE

## 2024-11-27 PROCEDURE — 76870 US EXAM SCROTUM: CPT

## 2025-02-03 NOTE — H&P PST ADULT - MS GEN HX ROS MEA POS PC
Procedure completed. Patient tolerated well; VSS. Site CDI. 3150 ml of clear, yellow ascites drained. Patient to be transported to be discharged per orders.      joint swelling/joint pain

## 2025-02-27 ENCOUNTER — APPOINTMENT (OUTPATIENT)
Dept: UROLOGY | Facility: CLINIC | Age: 70
End: 2025-02-27
Payer: MEDICARE

## 2025-02-27 VITALS
OXYGEN SATURATION: 98 % | DIASTOLIC BLOOD PRESSURE: 78 MMHG | TEMPERATURE: 97.3 F | HEIGHT: 71 IN | BODY MASS INDEX: 24.5 KG/M2 | HEART RATE: 56 BPM | RESPIRATION RATE: 16 BRPM | WEIGHT: 175 LBS | SYSTOLIC BLOOD PRESSURE: 128 MMHG

## 2025-02-27 DIAGNOSIS — N20.0 CALCULUS OF KIDNEY: ICD-10-CM

## 2025-02-27 DIAGNOSIS — N50.811 RIGHT TESTICULAR PAIN: ICD-10-CM

## 2025-02-27 DIAGNOSIS — N40.1 BENIGN PROSTATIC HYPERPLASIA WITH LOWER URINARY TRACT SYMPMS: ICD-10-CM

## 2025-02-27 DIAGNOSIS — N13.8 BENIGN PROSTATIC HYPERPLASIA WITH LOWER URINARY TRACT SYMPMS: ICD-10-CM

## 2025-02-27 DIAGNOSIS — N50.812 RIGHT TESTICULAR PAIN: ICD-10-CM

## 2025-02-27 PROCEDURE — 99214 OFFICE O/P EST MOD 30 MIN: CPT

## 2025-02-27 PROCEDURE — G2211 COMPLEX E/M VISIT ADD ON: CPT

## 2025-02-28 LAB — PSA SERPL-MCNC: 2.14 NG/ML

## 2025-04-08 ENCOUNTER — APPOINTMENT (OUTPATIENT)
Dept: ULTRASOUND IMAGING | Facility: CLINIC | Age: 70
End: 2025-04-08
Payer: MEDICARE

## 2025-04-08 PROCEDURE — 93925 LOWER EXTREMITY STUDY: CPT

## 2025-04-08 PROCEDURE — 93970 EXTREMITY STUDY: CPT

## 2025-04-21 ENCOUNTER — NON-APPOINTMENT (OUTPATIENT)
Age: 70
End: 2025-04-21

## 2025-04-22 ENCOUNTER — NON-APPOINTMENT (OUTPATIENT)
Age: 70
End: 2025-04-22

## 2025-04-23 ENCOUNTER — APPOINTMENT (OUTPATIENT)
Dept: ORTHOPEDIC SURGERY | Facility: CLINIC | Age: 70
End: 2025-04-23

## 2025-04-23 ENCOUNTER — NON-APPOINTMENT (OUTPATIENT)
Age: 70
End: 2025-04-23

## 2025-04-23 VITALS — WEIGHT: 175 LBS | HEIGHT: 71 IN | BODY MASS INDEX: 24.5 KG/M2

## 2025-04-23 DIAGNOSIS — M17.12 UNILATERAL PRIMARY OSTEOARTHRITIS, LEFT KNEE: ICD-10-CM

## 2025-04-23 PROCEDURE — 99203 OFFICE O/P NEW LOW 30 MIN: CPT

## 2025-04-23 PROCEDURE — 73564 X-RAY EXAM KNEE 4 OR MORE: CPT | Mod: LT

## 2025-05-03 PROBLEM — M17.12 PRIMARY OSTEOARTHRITIS OF LEFT KNEE: Status: ACTIVE | Noted: 2025-05-03

## 2025-05-13 ENCOUNTER — APPOINTMENT (OUTPATIENT)
Dept: SPORTS MEDICINE | Facility: CLINIC | Age: 70
End: 2025-05-13
Payer: MEDICARE

## 2025-05-13 VITALS — BODY MASS INDEX: 24.5 KG/M2 | WEIGHT: 175 LBS | HEIGHT: 71 IN

## 2025-05-13 PROCEDURE — 99214 OFFICE O/P EST MOD 30 MIN: CPT | Mod: 25

## 2025-05-13 PROCEDURE — 20611 DRAIN/INJ JOINT/BURSA W/US: CPT | Mod: LT

## 2025-09-18 ENCOUNTER — APPOINTMENT (OUTPATIENT)
Dept: OTOLARYNGOLOGY | Facility: CLINIC | Age: 70
End: 2025-09-18
Payer: MEDICARE

## 2025-09-18 VITALS — SYSTOLIC BLOOD PRESSURE: 147 MMHG | DIASTOLIC BLOOD PRESSURE: 71 MMHG | HEART RATE: 62 BPM | TEMPERATURE: 98 F

## 2025-09-18 DIAGNOSIS — J34.2 DEVIATED NASAL SEPTUM: ICD-10-CM

## 2025-09-18 DIAGNOSIS — R09.81 NASAL CONGESTION: ICD-10-CM

## 2025-09-18 DIAGNOSIS — J34.89 OTHER SPECIFIED DISORDERS OF NOSE AND NASAL SINUSES: ICD-10-CM

## 2025-09-18 PROCEDURE — 99214 OFFICE O/P EST MOD 30 MIN: CPT | Mod: 25

## 2025-09-18 PROCEDURE — 31231 NASAL ENDOSCOPY DX: CPT

## 2025-09-18 PROCEDURE — 95004 PERQ TESTS W/ALRGNC XTRCS: CPT

## 2025-09-18 RX ORDER — LORATADINE 5 MG/5 ML
0.05 SOLUTION, ORAL ORAL
Qty: 1 | Refills: 0 | Status: ACTIVE | COMMUNITY
Start: 2025-09-18 | End: 1900-01-01

## 2025-09-18 RX ORDER — DOXYCYCLINE 100 MG/1
100 CAPSULE ORAL
Qty: 28 | Refills: 0 | Status: ACTIVE | COMMUNITY
Start: 2025-09-18 | End: 1900-01-01

## 2025-09-18 RX ORDER — FLUTICASONE PROPIONATE 50 UG/1
50 SPRAY NASAL DAILY
Qty: 1 | Refills: 3 | Status: ACTIVE | COMMUNITY
Start: 2025-09-18 | End: 1900-01-01

## 2025-09-18 RX ORDER — ELASTIC BANDAGE 1"X2.2YD
2300-700 BANDAGE TOPICAL
Qty: 1 | Refills: 0 | Status: ACTIVE | COMMUNITY
Start: 2025-09-18 | End: 1900-01-01